# Patient Record
Sex: MALE | Race: BLACK OR AFRICAN AMERICAN | NOT HISPANIC OR LATINO | Employment: FULL TIME | URBAN - METROPOLITAN AREA
[De-identification: names, ages, dates, MRNs, and addresses within clinical notes are randomized per-mention and may not be internally consistent; named-entity substitution may affect disease eponyms.]

---

## 2018-04-24 ENCOUNTER — HOSPITAL ENCOUNTER (EMERGENCY)
Facility: HOSPITAL | Age: 23
Discharge: HOME | End: 2018-04-24
Attending: EMERGENCY MEDICINE | Admitting: EMERGENCY MEDICINE
Payer: COMMERCIAL

## 2018-04-24 VITALS
SYSTOLIC BLOOD PRESSURE: 159 MMHG | TEMPERATURE: 98.4 F | HEART RATE: 79 BPM | RESPIRATION RATE: 18 BRPM | WEIGHT: 160 LBS | OXYGEN SATURATION: 100 % | DIASTOLIC BLOOD PRESSURE: 83 MMHG

## 2018-04-24 DIAGNOSIS — R59.9 LYMPH NODE ENLARGEMENT: Primary | ICD-10-CM

## 2018-04-24 PROCEDURE — 99282 EMERGENCY DEPT VISIT SF MDM: CPT

## 2018-04-25 NOTE — ED PROVIDER NOTES
History  Chief Complaint   Patient presents with    Sore Throat     pt with sore throat and lump on R side for last few days that is causing him pain  Patient is a 40-year-old male presenting to the ER today with a chief complaint of a "lump on right side of my face"  States he noticed this lump over the past 3 days  States it is freely mobile  Occasionally associated with a dull ache  When symptoms persisted patient wanted to come to the ER for further evaluation treatment  During this time patient denies fevers, chills, night sweats, recent weight loss  Denies any sore throat  History provided by:  Patient   used: No    Sore Throat   Location:  Right  Onset quality:  Gradual  Duration:  3 days  Timing:  Constant  Progression:  Unchanged  Chronicity:  New  Relieved by:  Nothing  Worsened by:  Nothing  Ineffective treatments:  None tried  Associated symptoms: no abdominal pain, no chills and no fever        None       History reviewed  No pertinent past medical history  History reviewed  No pertinent surgical history  History reviewed  No pertinent family history  I have reviewed and agree with the history as documented  Social History   Substance Use Topics    Smoking status: Current Every Day Smoker     Packs/day: 0 20     Types: Cigarettes    Smokeless tobacco: Never Used    Alcohol use No        Review of Systems   Constitutional: Negative for activity change, appetite change, chills, fatigue and fever  HENT: Negative for congestion, dental problem and sore throat  Eyes: Negative  Respiratory: Negative  Cardiovascular: Negative  Gastrointestinal: Negative for abdominal distention, abdominal pain, blood in stool, constipation, diarrhea, nausea and vomiting  Endocrine: Negative      Genitourinary: Negative for decreased urine volume, difficulty urinating, dysuria, enuresis, flank pain, frequency, hematuria, penile swelling, scrotal swelling, testicular pain and urgency  Musculoskeletal: Negative  Skin: Negative  Allergic/Immunologic: Negative  Neurological: Negative  Hematological: Negative  Psychiatric/Behavioral: Negative  All other systems reviewed and are negative  Physical Exam  ED Triage Vitals   Temperature Pulse Respirations Blood Pressure SpO2   04/24/18 2009 04/24/18 2009 04/24/18 2009 04/24/18 2009 04/24/18 2009   98 4 °F (36 9 °C) 79 18 159/83 100 %      Temp Source Heart Rate Source Patient Position - Orthostatic VS BP Location FiO2 (%)   04/24/18 2009 04/24/18 2009 04/24/18 2009 04/24/18 2009 --   Oral Monitor Lying Right arm       Pain Score       04/24/18 2008       4           Orthostatic Vital Signs  Vitals:    04/24/18 2009   BP: 159/83   Pulse: 79   Patient Position - Orthostatic VS: Lying       Physical Exam   Constitutional: He is oriented to person, place, and time  He appears well-developed and well-nourished  HENT:   Head: Normocephalic and atraumatic  Right Ear: External ear normal    Left Ear: External ear normal    Nose: Nose normal    Mouth/Throat: Oropharynx is clear and moist    Palpable   25cm mass over the distal right masseter muscle  Freely mobile  Feels rubbery in texture  No overlying skin changes noted  Eyes: Conjunctivae and EOM are normal  Pupils are equal, round, and reactive to light  Neck: Normal range of motion  Neck supple  No JVD present  No tracheal deviation present  No thyromegaly present  Cardiovascular: Normal rate, regular rhythm, normal heart sounds and intact distal pulses  Exam reveals no gallop and no friction rub  No murmur heard  Pulmonary/Chest: Effort normal and breath sounds normal  No stridor  No respiratory distress  He has no wheezes  He has no rales  He exhibits no tenderness  Abdominal: Soft  Bowel sounds are normal  He exhibits no distension and no mass  There is no tenderness  There is no rebound and no guarding  No hernia     Musculoskeletal: Normal range of motion  He exhibits no edema, tenderness or deformity  Lymphadenopathy:     He has no cervical adenopathy  Neurological: He is alert and oriented to person, place, and time  He has normal reflexes  He displays normal reflexes  No cranial nerve deficit  He exhibits normal muscle tone  Coordination normal    Skin: Skin is warm  No rash noted  No erythema  No pallor  Psychiatric: He has a normal mood and affect  His behavior is normal  Judgment and thought content normal    Nursing note and vitals reviewed  ED Medications  Medications - No data to display    Diagnostic Studies  Results Reviewed     None                 No orders to display         Procedures  Procedures      Phone Consults  ED Phone Contact    ED Course  ED Course                                MDM  Number of Diagnoses or Management Options  Lymph node enlargement: new and requires workup     Amount and/or Complexity of Data Reviewed  Clinical lab tests: ordered and reviewed  Tests in the radiology section of CPT®: ordered and reviewed  Tests in the medicine section of CPT®: reviewed and ordered  Decide to obtain previous medical records or to obtain history from someone other than the patient: yes  Independent visualization of images, tracings, or specimens: yes    Patient Progress  Patient progress: stable    CritCare Time    Disposition  Final diagnoses:   Lymph node enlargement     Time reflects when diagnosis was documented in both MDM as applicable and the Disposition within this note     Time User Action Codes Description Comment    4/24/2018  8:29 PM Carmela Archer Add [R59 9] Lymph node enlargement       ED Disposition     ED Disposition Condition Comment    Discharge  [address] LYNDA Esqueda discharge to home/self care      Condition at discharge: Good        Follow-up Information     Follow up With Specialties Details Why Contact Info    [employer]  Schedule an appointment as soon as possible for a visit  [phone] There are no discharge medications for this patient  No discharge procedures on file  ED Provider  Attending physically available and evaluated Marcus Garcia  [employer] managed the patient along with the ED Attending      Electronically Signed by         Derek Jaramillo DO  04/24/18 2100

## 2018-04-25 NOTE — ED ATTENDING ATTESTATION
Annemarie Farrell MD, saw and evaluated the patient  I have discussed the patient with the resident/non-physician practitioner and agree with the resident's/non-physician practitioner's findings, Plan of Care, and MDM as documented in the resident's/non-physician practitioner's note, except where noted  All available labs and Radiology studies were reviewed  At this point I agree with the current assessment done in the Emergency Department    I have conducted an independent evaluation of this patient a history and physical is as follows:   pt has noted lump on side of jaw that occasionally is painful This is present for 3 days PE: small less than 1 cm freely moveable nontender  Somewhat rubbery mass Likely lymph node  Informed pt to follw if it gets hard painful or grows return we will ct if he is concerned will give fu for possible biopsy     Critical Care Time  CritCare Time    Procedures

## 2018-04-25 NOTE — DISCHARGE INSTRUCTIONS
Lymphadenopathy   WHAT YOU NEED TO KNOW:   Lymphadenopathy is swelling of your lymph nodes  Lymph nodes are small organs that are part of your immune system  The lymph nodes are found throughout your body  They are most easily felt in your neck, under your arms, and near your groin  Lymphadenopathy can occur in one or more areas of your body  It is usually caused by an infection  DISCHARGE INSTRUCTIONS:   Return to the emergency department if:   · The swollen lymph nodes bleed  · You have swollen lymph nodes in your neck that affect your breathing or swallowing  Contact your healthcare provider if:   · You have a fever  · You have a new swollen and painful lymph node  · You have a skin rash  · Your lymph node remains swollen or painful, or it gets bigger  · Your lymph node has red streaks around it, or the skin around the lymph node is red  · You have questions or concerns about your condition or care  Follow up with your healthcare provider as directed:  Write down your questions so you remember to ask them during your visits  Self-care:   · Do not poke or squeeze  the swollen lymph nodes  · Apply heat to the swollen glands  You may use warm compresses, or an electric heating pad set on low  · Rest as needed  If you have a fever, rest until your temperature returns to normal  Return to your normal daily activities slowly after your fever is gone  © 2017 [address] Information is for End User's use only and may not be sold, redistributed or otherwise used for commercial purposes  All illustrations and images included in CareNotes® are the copyrighted property of [employer]  or [facility]  The above information is an  only  It is not intended as medical advice for individual conditions or treatments  Talk to your doctor, nurse or pharmacist before following any medical regimen to see if it is safe and effective for you

## 2018-05-04 ENCOUNTER — HOSPITAL ENCOUNTER (EMERGENCY)
Facility: HOSPITAL | Age: 23
Discharge: HOME | End: 2018-05-04
Attending: EMERGENCY MEDICINE | Admitting: EMERGENCY MEDICINE
Payer: COMMERCIAL

## 2018-05-04 VITALS
SYSTOLIC BLOOD PRESSURE: 120 MMHG | RESPIRATION RATE: 18 BRPM | TEMPERATURE: 98.8 F | HEIGHT: 71 IN | DIASTOLIC BLOOD PRESSURE: 91 MMHG | OXYGEN SATURATION: 100 % | HEART RATE: 66 BPM | WEIGHT: 165 LBS | BODY MASS INDEX: 23.1 KG/M2

## 2018-05-04 DIAGNOSIS — R19.7 DIARRHEA: ICD-10-CM

## 2018-05-04 DIAGNOSIS — R11.2 NAUSEA AND VOMITING: Primary | ICD-10-CM

## 2018-05-04 PROCEDURE — 96374 THER/PROPH/DIAG INJ IV PUSH: CPT

## 2018-05-04 PROCEDURE — 99283 EMERGENCY DEPT VISIT LOW MDM: CPT

## 2018-05-04 RX ORDER — ONDANSETRON 4 MG/1
4 TABLET, ORALLY DISINTEGRATING ORAL EVERY 8 HOURS PRN
Qty: 8 TABLET | Refills: 0 | Status: SHIPPED | OUTPATIENT
Start: 2018-05-04 | End: 2020-01-26

## 2018-05-04 RX ORDER — DICYCLOMINE HCL 20 MG
20 TABLET ORAL 2 TIMES DAILY
Qty: 20 TABLET | Refills: 0 | Status: SHIPPED | OUTPATIENT
Start: 2018-05-04 | End: 2020-01-26

## 2018-05-04 RX ORDER — DICYCLOMINE HCL 20 MG
20 TABLET ORAL ONCE
Status: COMPLETED | OUTPATIENT
Start: 2018-05-04 | End: 2018-05-04

## 2018-05-04 RX ORDER — ONDANSETRON 4 MG/1
4 TABLET, ORALLY DISINTEGRATING ORAL EVERY 6 HOURS PRN
Qty: 20 TABLET | Refills: 0 | Status: SHIPPED | OUTPATIENT
Start: 2018-05-04 | End: 2018-05-04

## 2018-05-04 RX ORDER — ONDANSETRON 2 MG/ML
4 INJECTION INTRAMUSCULAR; INTRAVENOUS ONCE
Status: COMPLETED | OUTPATIENT
Start: 2018-05-04 | End: 2018-05-04

## 2018-05-04 RX ADMIN — ONDANSETRON 4 MG: 2 INJECTION INTRAMUSCULAR; INTRAVENOUS at 08:30

## 2018-05-04 RX ADMIN — DICYCLOMINE HYDROCHLORIDE 20 MG: 20 TABLET ORAL at 08:29

## 2018-05-04 RX ADMIN — SODIUM CHLORIDE 500 ML: 0.9 INJECTION, SOLUTION INTRAVENOUS at 08:28

## 2018-05-04 NOTE — DISCHARGE INSTRUCTIONS
Acute Nausea and Vomiting, Ambulatory Care   GENERAL INFORMATION:   Acute nausea and vomiting  starts suddenly, gets worse quickly, and lasts a short time  Nausea and vomiting may be caused by pregnancy, alcohol, infection, or medicines  Common related symptoms include the following:   · Fever    · Abdominal swelling    · Pain, tenderness, or a lump in the abdomen    · Splashing sounds heard in your stomach when you move  Seek immediate care for the following symptoms:   · Blood in your vomit or bowel movements    · Sudden, severe pain in your chest and upper abdomen after hard vomiting    · Dizziness, dry mouth, and thirst    · Urinating very little or not at all    · Muscle weakness, leg cramps, and trouble breathing    · A heart beat that is faster than normal    · Vomiting for more than 48 hours  Treatment for acute nausea and vomiting  may include medicines to calm your stomach and stop the vomiting  You may need IV fluids if you are dehydrated  Manage your nausea and vomiting:   · Drink liquids as directed to prevent dehydration  Ask how much liquid to drink each day and which liquids are best for you  You may need to drink an oral rehydration solution (ORS)  ORS contains water, salts, and sugar that are needed to replace the lost body fluids  Ask what kind of ORS to use, how much to drink, and where to get it  · Eat smaller meals, more often  Eat small amounts of food every 2 to 3 hours, even if you are not hungry  Food in your stomach may help decrease your nausea  · Avoid stress  Find ways to relax and manage your stress  Headaches due to stress may cause nausea and vomiting  Get more rest and sleep  Follow up with your healthcare provider as directed:  Write down your questions so you remember to ask them during your visits  CARE AGREEMENT:   You have the right to help plan your care  Learn about your health condition and how it may be treated   Discuss treatment options with your caregivers to decide what care you want to receive  You always have the right to refuse treatment  The above information is an  only  It is not intended as medical advice for individual conditions or treatments  Talk to your doctor, nurse or pharmacist before following any medical regimen to see if it is safe and effective for you  © 2014 [address] is for End User's use only and may not be sold, redistributed or otherwise used for commercial purposes  All illustrations and images included in CareNotes® are the copyrighted property of [employer]  or [employer]  Acute Diarrhea   WHAT YOU NEED TO KNOW:   What is acute diarrhea? Acute diarrhea starts quickly and lasts a short time, usually 1 to 3 days  It can last up to 2 weeks  What causes acute diarrhea? · Bacteria, such as E coli or salmonella    · Viruses, such as rotavirus and norovirus    · A parasite, such as giardia    · Medicines, such as laxatives, antacids, or antibiotics    · An allergy to lactose, soy, or gluten    · Eating food or drinking water that contains germs    · Medical treatments, such as chemotherapy or radiation  What other signs and symptoms might I have with acute diarrhea? You may have 3 or more episodes of diarrhea  It may be hard to control your diarrhea  You may also have any of the following:  · Fever and chills    · Headache or abdominal pain    · Nausea and vomiting    · Symptoms of dehydration such as thirst, decreased urination, dry skin, sunken eyes, or fast, pounding heartbeat  What does my healthcare provider need to know about my acute diarrhea? Your healthcare provider will ask about your symptoms  He or she will ask what you have recently eaten and if you have traveled to other countries  Tell the provider what medicines you use or if you have been around anyone who is sick  Your healthcare provider may check you for signs of dehydration     How is acute diarrhea treated? Acute diarrhea usually gets better without treatment  You may need any of the following if your diarrhea is severe or lasts longer than a few days:  · Diarrhea medicine  is an over-the-counter medicine that helps slow or stop your diarrhea  · Antibiotics  may be given to help treat an infection caused by bacteria  · Parasite medicine  may be given to treat an infection caused by parasites  How can acute diarrhea be managed? · Drink liquids as directed  Liquids will help prevent dehydration caused by diarrhea  Ask your healthcare provider how much liquid to drink each day and which liquids are best for you  You may need to drink an oral rehydration solution (ORS)  An ORS has the right amounts of water, salts, and sugar you need to replace body fluids  You can buy an ORS at most grocery stores and pharmacies  · Eat foods that are easy to digest   Examples include rice, lentils, cereal, bananas, potatoes, and bread  It also includes some fruits (bananas, melon), well-cooked vegetables, and lean meats  Avoid foods high in fiber, fat, and sugar  Also avoid caffeine, alcohol, dairy, and red meat until your diarrhea is gone  How can acute diarrhea be prevented? · Wash your hands often  Use soap and water  Wash your hands before you eat or prepare food  Also wash your hands after you use the bathroom  Use an alcohol-based hand gel when soap and water are not available  · Keep bathroom surfaces clean  This helps prevent the spread of germs that cause acute diarrhea  · Wash fruits and vegetables well before you eat them  This can help remove germs that cause diarrhea  If possible, remove the skin from fruits and vegetables, or cook them well before you eat them  · Cook meat as directed  ¨ Cook ground meat  to 160°F      ¨ Cook ground poultry, whole poultry, or cuts of poultry  to at least 165°F  Remove the meat from heat  Let it stand for 3 minutes before you eat it       [employer] whole cuts of meat other than poultry  to at least 145°F  Remove the meat from heat  Let it stand for 3 minutes before you eat it  · Wash dishes that have touched raw meat with hot water and soap  This includes cutting boards, utensils, dishes, and serving containers  · Place raw or cooked meat in the refrigerator as soon as possible  Bacteria can grow in meat that is left at room temperature too long  · Do not eat raw or undercooked oysters, clams, or mussels  These foods may be contaminated and cause infection  · Drink filtered or treated water only when you travel  Do not put ice in your drinks  Drink bottled water whenever possible  When should I seek immediate care? · You feel confused  · Your heartbeat is faster than normal      · Your eyes look deeply sunken, or you have no tears when you cry  · You urinate less than usual, or your urine is dark yellow  · You have blood or mucus in your stools  · You have severe abdominal pain  · You are unable to drink any liquids  When should I contact my healthcare provider? · Your symptoms do not get better with treatment  · You have a fever higher than 101 3°F (38 5°C)  · You have trouble eating and drinking because you are vomiting  · You are thirsty or have a dry mouth  · Your diarrhea does not get better in 7 days  · You have questions or concerns about your condition or care  CARE AGREEMENT:   You have the right to help plan your care  Learn about your health condition and how it may be treated  Discuss treatment options with your caregivers to decide what care you want to receive  You always have the right to refuse treatment  The above information is an  only  It is not intended as medical advice for individual conditions or treatments  Talk to your doctor, nurse or pharmacist before following any medical regimen to see if it is safe and effective for you    © 2017 [employer] [address] [place] [address] is for End User's use only and may not be sold, redistributed or otherwise used for commercial purposes  All illustrations and images included in CareNotes® are the copyrighted property of [employer]  or [employer]

## 2018-05-04 NOTE — ED PROVIDER NOTES
History  Chief Complaint   Patient presents with    Vomiting     Vomitng and diarrhea since yesterday afternoon  Only able to keep small amounts of fluids in      24 y/o male with Nausea and vomiting + diarrhea x 24 hrs  States started suddenly  Essentially no abd pain but does get intermediate cramping sensation throughout  Works in a call center; no known sick contacts  Has been drinking liquids but throwing up solids  Denies F/C, CP, SOB and urinary symptoms  No blood in stool  No drinking unclean water, undercooked food  No recent travel  No medical issues outside of past :anemia" not on anything for  Vitals WNL's; patient appears comfortable  No abd pain at this time, no recent episodes of emesis  None       History reviewed  No pertinent past medical history  History reviewed  No pertinent surgical history  History reviewed  No pertinent family history  I have reviewed and agree with the history as documented  Social History   Substance Use Topics    Smoking status: Current Every Day Smoker     Packs/day: 0 20     Types: Cigarettes    Smokeless tobacco: Never Used    Alcohol use No        Review of Systems   Constitutional: Positive for appetite change  Negative for activity change, chills and fever  HENT: Negative for congestion, rhinorrhea and sore throat  Eyes: Negative for photophobia, pain and visual disturbance  Respiratory: Negative for cough, chest tightness and wheezing  Cardiovascular: Negative for chest pain, palpitations and leg swelling  Gastrointestinal: Positive for diarrhea, nausea and vomiting  Negative for abdominal distention, abdominal pain and constipation  Genitourinary: Negative for decreased urine volume, difficulty urinating, dysuria, flank pain, frequency and hematuria  Musculoskeletal: Negative for arthralgias, back pain, myalgias, neck pain and neck stiffness  Skin: Negative for color change, pallor, rash and wound  Neurological: Negative for seizures, syncope, speech difficulty, weakness, light-headedness and headaches  Hematological: Negative for adenopathy  Psychiatric/Behavioral: Negative for agitation, confusion, hallucinations and suicidal ideas  Physical Exam  ED Triage Vitals   Temperature Pulse Respirations Blood Pressure SpO2   05/04/18 0727 05/04/18 0727 05/04/18 0727 05/04/18 0727 05/04/18 0727   98 8 °F (37 1 °C) 75 18 141/76 100 %      Temp Source Heart Rate Source Patient Position - Orthostatic VS BP Location FiO2 (%)   05/04/18 0727 05/04/18 0727 05/04/18 0845 05/04/18 0915 --   Oral Monitor Sitting Right arm       Pain Score       05/04/18 0727       1           Orthostatic Vital Signs  Vitals:    05/04/18 0727 05/04/18 0845 05/04/18 0915   BP: 141/76 127/85 120/91   Pulse: 75 68 66   Patient Position - Orthostatic VS:  Sitting Sitting       Physical Exam   Constitutional: He is oriented to person, place, and time  He appears well-developed and well-nourished  No distress  HENT:   Head: Normocephalic and atraumatic  Right Ear: External ear normal    Left Ear: External ear normal    Mouth/Throat: No oropharyngeal exudate  Moist oral mucosa   Eyes: EOM are normal  Pupils are equal, round, and reactive to light  Right eye exhibits no discharge  Left eye exhibits no discharge  No scleral icterus  Neck: Normal range of motion  Neck supple  No JVD present  No tracheal deviation present  Cardiovascular: Normal rate and normal heart sounds  No murmur heard  Pulmonary/Chest: Effort normal and breath sounds normal  No respiratory distress  He has no wheezes  He has no rales  No increased WOB   Abdominal: Soft  Bowel sounds are normal  He exhibits no distension  There is no tenderness  There is no rebound and no guarding  No reproudcible abd tenderness x 4 quadrants  Soft, not distended   Musculoskeletal: Normal range of motion  He exhibits no edema, tenderness or deformity  Lymphadenopathy:     He has no cervical adenopathy  Neurological: He is alert and oriented to person, place, and time  No cranial nerve deficit  He exhibits normal muscle tone  Skin: Skin is warm and dry  Capillary refill takes less than 2 seconds  No erythema  No pallor  Psychiatric: He has a normal mood and affect  His behavior is normal  Judgment and thought content normal        ED Medications  Medications   ondansetron (ZOFRAN) injection 4 mg (4 mg Intravenous Given 5/4/18 0830)   sodium chloride 0 9 % bolus 500 mL (0 mL Intravenous Stopped 5/4/18 0854)   dicyclomine (BENTYL) tablet 20 mg (20 mg Oral Given 5/4/18 0829)       Diagnostic Studies  Results Reviewed     None                 No orders to display         Procedures  Procedures      Phone Consults  ED Phone Contact    ED Course                               MDM  Number of Diagnoses or Management Options  Diarrhea:   Nausea and vomiting:   Diagnosis management comments: N/V/D likely viral gastroenteritis  Has no abd discomfort at this time  Vitals WNL's  Already had IV placed so gave IVF and zofran  Felt well, no further episodes of symptoms, D/C home with zofran and bentyl  Understands to return if continues to be unable to keep down PO intake, return precautions  CritCare Time    Disposition  Final diagnoses:   Nausea and vomiting   Diarrhea     Time reflects when diagnosis was documented in both MDM as applicable and the Disposition within this note     Time User Action Codes Description Comment    5/4/2018  9:17 AM Judd Diver Add [R11 2] Nausea and vomiting     5/4/2018  9:17 AM Judd Diver Add [R19 7] Diarrhea       ED Disposition     ED Disposition Condition Comment    Discharge  Coral Conn discharge to home/self care      Condition at discharge: Good        Follow-up Information     Follow up With Specialties Details Why Contact Info Additional [address] Emergency Department Emergency Medicine Go in 2 days As needed, If symptoms worsen [address]  [phone]  ED, [address] [address], [place], [place], [facility]  Call  to establish family doctor [phone]           Discharge Medication List as of 5/4/2018  9:18 AM      START taking these medications    Details   dicyclomine (BENTYL) 20 mg tablet Take 1 tablet (20 mg total) by mouth 2 (two) times a day, Starting Fri 5/4/2018, Print      ondansetron (ZOFRAN-ODT) 4 mg disintegrating tablet Take 1 tablet (4 mg total) by mouth every 6 (six) hours as needed for nausea or vomiting, Starting Fri 5/4/2018, Print           No discharge procedures on file  ED Provider  Attending physically available and evaluated Blake Brizuela I managed the patient along with the ED Attending      Electronically Signed by         Oz Perry DO  05/05/18 0699

## 2018-05-04 NOTE — ED ATTENDING ATTESTATION
Mary Lou Romo MD, saw and evaluated the patient  I have discussed the patient with the resident/non-physician practitioner and agree with the resident's/non-physician practitioner's findings, Plan of Care, and MDM as documented in the resident's/non-physician practitioner's note, except where noted  All available labs and Radiology studies were reviewed  At this point I agree with the current assessment done in the Emergency Department  I have conducted an independent evaluation of this patient a history and physical is as follows:  23year-old with vomiting and diarrhea since yesterday  Last episode of vomiting was this morning  Patient had about 20 episodes of diarrhea over the last 24 hr   No fevers or chills  No abdominal pain  No prior surgical history  Patient is otherwise healthy  Review of systems otherwise negative in 12 systems were reviewed  On exam Vital signs were reviewed  Patient is awake, alert, interactive  The patient's pupils are equally round reactive to light  Oropharynx is clear with moist mucous membranes  Neck is supple and nontender with no adenopathy or JVD  Heart is regular with no murmurs, rubs, or gallops  Lungs are clear and equal with no wheezes, rales, or rhonchi  Abdomen is soft and nontender with no masses, rebound, or guarding  There is no CVA tenderness  The patient was completely exposed  There is no skin breakdown  There are no rashes or skin changes  Extremities are warm and well perfused with good pulses  The patient has normal strength, sensation, and cranial nerves  Impression:  Gastroenteritis    Patient has IV in place, will give IV fluids and antiemetics and discharge    Critical Care Time  CritCare Time    Procedures

## 2018-05-08 ENCOUNTER — HOSPITAL ENCOUNTER (EMERGENCY)
Facility: HOSPITAL | Age: 23
Discharge: HOME | End: 2018-05-08
Attending: EMERGENCY MEDICINE | Admitting: EMERGENCY MEDICINE
Payer: COMMERCIAL

## 2018-05-08 VITALS
BODY MASS INDEX: 23.01 KG/M2 | TEMPERATURE: 98.3 F | RESPIRATION RATE: 14 BRPM | HEART RATE: 102 BPM | OXYGEN SATURATION: 99 % | SYSTOLIC BLOOD PRESSURE: 131 MMHG | WEIGHT: 165 LBS | DIASTOLIC BLOOD PRESSURE: 64 MMHG

## 2018-05-08 DIAGNOSIS — R11.2 NAUSEA, VOMITING, AND DIARRHEA: ICD-10-CM

## 2018-05-08 DIAGNOSIS — R19.7 NAUSEA, VOMITING, AND DIARRHEA: ICD-10-CM

## 2018-05-08 DIAGNOSIS — R10.9 ABDOMINAL PAIN: Primary | ICD-10-CM

## 2018-05-08 LAB
ALBUMIN SERPL BCP-MCNC: 4.1 G/DL (ref 3.5–5)
ALP SERPL-CCNC: 54 U/L (ref 46–116)
ALT SERPL W P-5'-P-CCNC: 48 U/L (ref 12–78)
ANION GAP SERPL CALCULATED.3IONS-SCNC: 5 MMOL/L (ref 4–13)
AST SERPL W P-5'-P-CCNC: 26 U/L (ref 5–45)
BACTERIA UR QL AUTO: ABNORMAL /HPF
BASOPHILS # BLD AUTO: 0.03 THOUSANDS/ΜL (ref 0–0.1)
BASOPHILS NFR BLD AUTO: 0 % (ref 0–1)
BILIRUB SERPL-MCNC: 0.94 MG/DL (ref 0.2–1)
BILIRUB UR QL STRIP: ABNORMAL
BUN SERPL-MCNC: 14 MG/DL (ref 5–25)
CALCIUM SERPL-MCNC: 9.6 MG/DL (ref 8.3–10.1)
CHLORIDE SERPL-SCNC: 104 MMOL/L (ref 100–108)
CLARITY UR: CLEAR
CO2 SERPL-SCNC: 27 MMOL/L (ref 21–32)
COLOR UR: YELLOW
CREAT SERPL-MCNC: 1.12 MG/DL (ref 0.6–1.3)
EOSINOPHIL # BLD AUTO: 0.09 THOUSAND/ΜL (ref 0–0.61)
EOSINOPHIL NFR BLD AUTO: 1 % (ref 0–6)
ERYTHROCYTE [DISTWIDTH] IN BLOOD BY AUTOMATED COUNT: 14.6 % (ref 11.6–15.1)
GFR SERPL CREATININE-BSD FRML MDRD: 106 ML/MIN/1.73SQ M
GLUCOSE SERPL-MCNC: 98 MG/DL (ref 65–140)
GLUCOSE UR STRIP-MCNC: NEGATIVE MG/DL
HCT VFR BLD AUTO: 44.9 % (ref 36.5–49.3)
HGB BLD-MCNC: 15.9 G/DL (ref 12–17)
HGB UR QL STRIP.AUTO: ABNORMAL
HYALINE CASTS #/AREA URNS LPF: ABNORMAL /LPF
KETONES UR STRIP-MCNC: ABNORMAL MG/DL
LEUKOCYTE ESTERASE UR QL STRIP: NEGATIVE
LIPASE SERPL-CCNC: 89 U/L (ref 73–393)
LYMPHOCYTES # BLD AUTO: 2.43 THOUSANDS/ΜL (ref 0.6–4.47)
LYMPHOCYTES NFR BLD AUTO: 17 % (ref 14–44)
MAGNESIUM SERPL-MCNC: 2.2 MG/DL (ref 1.6–2.6)
MCH RBC QN AUTO: 31.3 PG (ref 26.8–34.3)
MCHC RBC AUTO-ENTMCNC: 35.4 G/DL (ref 31.4–37.4)
MCV RBC AUTO: 88 FL (ref 82–98)
MONOCYTES # BLD AUTO: 1.34 THOUSAND/ΜL (ref 0.17–1.22)
MONOCYTES NFR BLD AUTO: 9 % (ref 4–12)
NEUTROPHILS # BLD AUTO: 10.7 THOUSANDS/ΜL (ref 1.85–7.62)
NEUTS SEG NFR BLD AUTO: 73 % (ref 43–75)
NITRITE UR QL STRIP: NEGATIVE
NON-SQ EPI CELLS URNS QL MICRO: ABNORMAL /HPF
NRBC BLD AUTO-RTO: 0 /100 WBCS
PH UR STRIP.AUTO: 6.5 [PH] (ref 4.5–8)
PLATELET # BLD AUTO: 239 THOUSANDS/UL (ref 149–390)
PMV BLD AUTO: 10.7 FL (ref 8.9–12.7)
POTASSIUM SERPL-SCNC: 3.6 MMOL/L (ref 3.5–5.3)
PROT SERPL-MCNC: 8.6 G/DL (ref 6.4–8.2)
PROT UR STRIP-MCNC: ABNORMAL MG/DL
RBC # BLD AUTO: 5.08 MILLION/UL (ref 3.88–5.62)
RBC #/AREA URNS AUTO: ABNORMAL /HPF
SODIUM SERPL-SCNC: 136 MMOL/L (ref 136–145)
SP GR UR STRIP.AUTO: 1.02 (ref 1–1.03)
UROBILINOGEN UR QL STRIP.AUTO: 1 E.U./DL
WBC # BLD AUTO: 14.64 THOUSAND/UL (ref 4.31–10.16)
WBC #/AREA URNS AUTO: ABNORMAL /HPF

## 2018-05-08 PROCEDURE — 85025 COMPLETE CBC W/AUTO DIFF WBC: CPT | Performed by: EMERGENCY MEDICINE

## 2018-05-08 PROCEDURE — 81001 URINALYSIS AUTO W/SCOPE: CPT

## 2018-05-08 PROCEDURE — 83690 ASSAY OF LIPASE: CPT | Performed by: EMERGENCY MEDICINE

## 2018-05-08 PROCEDURE — 99284 EMERGENCY DEPT VISIT MOD MDM: CPT

## 2018-05-08 PROCEDURE — 96361 HYDRATE IV INFUSION ADD-ON: CPT

## 2018-05-08 PROCEDURE — 93005 ELECTROCARDIOGRAM TRACING: CPT

## 2018-05-08 PROCEDURE — 80053 COMPREHEN METABOLIC PANEL: CPT | Performed by: EMERGENCY MEDICINE

## 2018-05-08 PROCEDURE — 96374 THER/PROPH/DIAG INJ IV PUSH: CPT

## 2018-05-08 PROCEDURE — 36415 COLL VENOUS BLD VENIPUNCTURE: CPT | Performed by: EMERGENCY MEDICINE

## 2018-05-08 PROCEDURE — 83735 ASSAY OF MAGNESIUM: CPT | Performed by: EMERGENCY MEDICINE

## 2018-05-08 RX ORDER — ONDANSETRON 2 MG/ML
4 INJECTION INTRAMUSCULAR; INTRAVENOUS ONCE
Status: COMPLETED | OUTPATIENT
Start: 2018-05-08 | End: 2018-05-08

## 2018-05-08 RX ORDER — ONDANSETRON 4 MG/1
4 TABLET, ORALLY DISINTEGRATING ORAL EVERY 8 HOURS PRN
Qty: 10 TABLET | Refills: 0 | Status: SHIPPED | OUTPATIENT
Start: 2018-05-08 | End: 2020-01-26

## 2018-05-08 RX ADMIN — ONDANSETRON 4 MG: 2 INJECTION INTRAMUSCULAR; INTRAVENOUS at 20:44

## 2018-05-08 RX ADMIN — HYOSCYAMINE SULFATE 0.12 MG: 0.12 TABLET ORAL at 20:45

## 2018-05-08 RX ADMIN — SODIUM CHLORIDE 2000 ML: 0.9 INJECTION, SOLUTION INTRAVENOUS at 20:33

## 2018-05-09 LAB
ATRIAL RATE: 98 BPM
P AXIS: 83 DEGREES
PR INTERVAL: 160 MS
QRS AXIS: 83 DEGREES
QRSD INTERVAL: 92 MS
QT INTERVAL: 320 MS
QTC INTERVAL: 408 MS
T WAVE AXIS: 76 DEGREES
VENTRICULAR RATE: 98 BPM

## 2018-05-09 PROCEDURE — 93010 ELECTROCARDIOGRAM REPORT: CPT | Performed by: INTERNAL MEDICINE

## 2018-05-09 NOTE — ED PROVIDER NOTES
History  Chief Complaint   Patient presents with    Abdominal Pain     Pt c/o abdominal cramping and NVD  Pt vomited 1x today and diarrhea 4x today  This is a 23 y o  old male who presents to the ED for evaluation of abdominal pain  Patient reports generalized abdominal discomfort with cramping, vomiting, diarrhea  Started this morning  It has been constant  He has had difficulty keeping down solids and liquids  No melena, hematochezia, hematemesis  No known sick contacts  Denies any fever, chills, night sweats, cough, just, runny nose, chest pain, shortness of breath, back pain, urinary symptoms  Prior to Admission Medications   Prescriptions Last Dose Informant Patient Reported? Taking?   dicyclomine (BENTYL) 20 mg tablet   No No   Sig: Take 1 tablet (20 mg total) by mouth 2 (two) times a day   ondansetron (ZOFRAN-ODT) 4 mg disintegrating tablet   No No   Sig: Take 1 tablet (4 mg total) by mouth every 8 (eight) hours as needed for nausea or vomiting      Facility-Administered Medications: None     History reviewed  No pertinent past medical history  History reviewed  No pertinent surgical history  History reviewed  No pertinent family history  I have reviewed and agree with the history as documented  Social History   Substance Use Topics    Smoking status: Current Every Day Smoker     Packs/day: 0 20     Types: Cigarettes    Smokeless tobacco: Never Used    Alcohol use No      Review of Systems   Constitutional: Negative for chills, fatigue, fever and unexpected weight change  HENT: Negative for congestion, rhinorrhea and sore throat  Eyes: Negative for redness and visual disturbance  Respiratory: Negative for cough and shortness of breath  Cardiovascular: Negative for chest pain and leg swelling  Gastrointestinal: Positive for abdominal pain, nausea and vomiting  Negative for constipation and diarrhea  Endocrine: Negative for cold intolerance and heat intolerance  Genitourinary: Negative for dysuria, frequency and urgency  Musculoskeletal: Negative for back pain  Skin: Negative for rash  Neurological: Negative for dizziness, syncope and numbness  All other systems reviewed and are negative  Physical Exam  ED Triage Vitals   Temperature Pulse Respirations Blood Pressure SpO2   05/08/18 1930 05/08/18 1930 05/08/18 1930 05/08/18 1930 05/08/18 1930   98 3 °F (36 8 °C) (!) 120 16 136/69 98 %      Temp src Heart Rate Source Patient Position - Orthostatic VS BP Location FiO2 (%)   -- 05/08/18 1958 05/08/18 2100 05/08/18 1958 --    Monitor Lying Left arm       Pain Score       05/08/18 1930       5         Physical Exam   Constitutional: He is oriented to person, place, and time  He appears well-developed and well-nourished  No distress  HENT:   Head: Normocephalic and atraumatic  Nose: Nose normal    Eyes: Conjunctivae and EOM are normal  Pupils are equal, round, and reactive to light  Neck: Normal range of motion  Neck supple  Cardiovascular: Normal rate, regular rhythm and normal heart sounds  Exam reveals no gallop  No murmur heard  Pulmonary/Chest: Effort normal and breath sounds normal  No respiratory distress  He has no wheezes  He has no rales  Abdominal: Soft  Bowel sounds are normal  He exhibits no distension and no mass  There is no tenderness  There is no rebound and no guarding  Musculoskeletal: Normal range of motion  He exhibits no edema or deformity  Lymphadenopathy:     He has no cervical adenopathy  Neurological: He is alert and oriented to person, place, and time  No cranial nerve deficit  Skin: Skin is warm and dry  No rash noted  He is not diaphoretic  No erythema  Psychiatric: He has a normal mood and affect  Nursing note and vitals reviewed      ED Medications  Medications   sodium chloride 0 9 % bolus 2,000 mL (0 mL Intravenous Stopped 5/8/18 2212)   ondansetron (ZOFRAN) injection 4 mg (4 mg Intravenous Given 5/8/18 2044) hyoscyamine (LEVSIN/SL) SL tablet 0 125 mg (0 125 mg Sublingual Given 5/8/18 2045)     Diagnostic Studies  Results Reviewed     Procedure Component Value Units Date/Time    Urine Microscopic [[id number]]  (Abnormal) Collected:  05/08/18 2205    Lab Status:  Final result Specimen:  Urine from Urine, Clean Catch Updated:  05/08/18 2211     RBC, UA 4-10 (A) /hpf      WBC, UA 2-4 (A) /hpf      Epithelial Cells None Seen /hpf      Bacteria, UA None Seen /hpf      Hyaline Casts, UA 5-10 (A) /lpf     ED Urine Macroscopic [[id number]]  (Abnormal) Collected:  05/08/18 2205    Lab Status:  Final result Specimen:  Urine Updated:  05/08/18 2200     Color, UA Yellow     Clarity, UA Clear     pH, UA 6 5     Leukocytes, UA Negative     Nitrite, UA Negative     Protein,  (2+) (A) mg/dl      Glucose, UA Negative mg/dl      Ketones, UA 15 (1+) (A) mg/dl      Urobilinogen, UA 1 0 E U /dl      Bilirubin, UA Interference- unable to analyze (A)     Blood, UA Trace (A)     Specific [place], UA 1 025    Narrative:       CLINITEK RESULT    Comprehensive metabolic panel [[id number]]  (Abnormal) Collected:  05/08/18 2031    Lab Status:  Final result Specimen:  Blood from Arm, Left Updated:  05/08/18 2135     Sodium 136 mmol/L      Potassium 3 6 mmol/L      Chloride 104 mmol/L      CO2 27 mmol/L      Anion Gap 5 mmol/L      BUN 14 mg/dL      Creatinine 1 12 mg/dL      Glucose 98 mg/dL      Calcium 9 6 mg/dL      AST 26 U/L      ALT 48 U/L      Alkaline Phosphatase 54 U/L      Total Protein 8 6 (H) g/dL      Albumin 4 1 g/dL      Total Bilirubin 0 94 mg/dL      eGFR 106 ml/min/1 73sq m     Narrative:         National Kidney Disease Education Program recommendations are as follows:  GFR calculation is accurate only with a steady state creatinine  Chronic Kidney disease less than 60 ml/min/1 73 sq  meters  Kidney failure less than 15 ml/min/1 73 sq  meters      Lipase [[id number]]  (Normal) Collected:  05/08/18 2031    Lab Status:  Final result Specimen:  Blood from Arm, Left Updated:  05/08/18 2135     Lipase 89 u/L     Magnesium [[id number]]  (Normal) Collected:  05/08/18 2031    Lab Status:  Final result Specimen:  Blood from Arm, Left Updated:  05/08/18 2135     Magnesium 2 2 mg/dL     CBC and differential [[id number]]  (Abnormal) Collected:  05/08/18 2031    Lab Status:  Final result Specimen:  Blood from Arm, Left Updated:  05/08/18 2056     WBC 14 64 (H) Thousand/uL      RBC 5 08 Million/uL      Hemoglobin 15 9 g/dL      Hematocrit 44 9 %      MCV 88 fL      MCH 31 3 pg      MCHC 35 4 g/dL      RDW 14 6 %      MPV 10 7 fL      Platelets 738 Thousands/uL      nRBC 0 /100 WBCs      Neutrophils Relative 73 %      Lymphocytes Relative 17 %      Monocytes Relative 9 %      Eosinophils Relative 1 %      Basophils Relative 0 %      Neutrophils Absolute 10 70 (H) Thousands/µL      Lymphocytes Absolute 2 43 Thousands/µL      Monocytes Absolute 1 34 (H) Thousand/µL      Eosinophils Absolute 0 09 Thousand/µL      Basophils Absolute 0 03 Thousands/µL     POCT urinalysis dipstick [[id number]]     Lab Status:  No result Specimen:  Urine         No orders to display     Procedures  ECG 12 Lead Documentation  Date/Time: 5/8/2018 8:35 PM  Performed by: Mary Ellen Bernard  Authorized by: Weyman Meigs D     Indications / Diagnosis:  Tachycardia  ECG reviewed by me, the ED Provider: yes    Patient location:  ED  Comments:      Sinus tachycardia, rate 100, normal intervals, normal axis, no acute ST, T-wave changes  No previous EKG is available  Phone Consults  ED Phone Contact    ED Course     A/P: This is a 23 y o  male who presents to the ED for evaluation of abdominal pain  He has a benign exam   Will check abdominal labs, EKG  Treat symptomatically  Re-evaluate  2200 patient's labs reviewed  Unremarkable  Urine does show small ketones consistent with nausea and vomiting  Patient is currently tolerating an oral diet  His tachycardia has resolved   I personally discussed return precautions with this patient  I provided the patient with written discharge instructions and particularly highlighted specific areas of interest to this patient, including but not limited to: medications for symptom managment, follow up recommendations, and return precautions  Patient is in agreement with this plan as outlined above  MDM  CritCare Time    Disposition  Final diagnoses:   Abdominal pain   Nausea, vomiting, and diarrhea     Time reflects when diagnosis was documented in both MDM as applicable and the Disposition within this note     Time User Action Codes Description Comment    5/8/2018 10:06 PM Adonis Mohr Add [R10 9] Abdominal pain     5/8/2018 10:06 PM Adonis Mohr Add [R11 2,  R19 7] Nausea, vomiting, and diarrhea       ED Disposition     ED Disposition Condition Comment    Discharge  Jaden Cid discharge to home/self care  Condition at discharge: Stable        Follow-up Information     Follow up With Specialties Details Why Contact Info Additional [address] Emergency Department Emergency Medicine Go to As needed [address] [place] [address], [address] [place] [address], [id number]    [employer]  Call Referral to [phone]           Discharge Medication List as of 5/8/2018 10:07 PM      START taking these medications    Details   hyoscyamine (LEVSIN/SL) 0 125 mg SL tablet Take 1 tablet (0 125 mg total) by mouth every 4 (four) hours as needed for cramping, Starting Tue 5/8/2018, Print      !! ondansetron (ZOFRAN-ODT) 4 mg disintegrating tablet Take 1 tablet (4 mg total) by mouth every 8 (eight) hours as needed for nausea or vomiting, Starting Tue 5/8/2018, Print       !! - Potential duplicate medications found  Please discuss with provider        CONTINUE these medications which have NOT CHANGED    Details   dicyclomine (BENTYL) 20 mg tablet Take 1 tablet (20 mg total) by mouth 2 (two) times a day, Starting Fri 5/4/2018, Print      !! ondansetron (ZOFRAN-ODT) 4 mg disintegrating tablet Take 1 tablet (4 mg total) by mouth every 8 (eight) hours as needed for nausea or vomiting, Starting Fri 5/4/2018, Print       !! - Potential duplicate medications found  Please discuss with provider  No discharge procedures on file  ED Provider  Attending physically available and evaluated Bobby Ortiz I managed the patient along with the ED Attending      Electronically Signed by         Henny Ruiz MD  05/08/18 1718

## 2018-05-09 NOTE — ED ATTENDING ATTESTATION
Francisco Burnham DO, saw and evaluated the patient  I have discussed the patient with the resident/non-physician practitioner and agree with the resident's/non-physician practitioner's findings, Plan of Care, and MDM as documented in the resident's/non-physician practitioner's note, except where noted  All available labs and Radiology studies were reviewed  At this point I agree with the current assessment done in the Emergency Department  I have conducted an independent evaluation of this patient including a focused history and a physical exam     ED Note - Bean Dutton 23 y o  male MRN: [id number]  Unit/Bed#: ED 23 Encounter: [id number]    History of Present Illness   HPI  Bean Dutton is a 23 y o  male who presents for evaluation of diffuse abdominal cramping associated with n/v/d since this morning  No fever chills  No melena or hematochezia  No urinary symptoms or flank pain  No home remedies  No obvious alleviating or exacerbating factors  No obvious sick contacts  No obvious abnormal food ingestions  REVIEW OF SYSTEMS  See HPI for further details  12 systems reviewed and otherwise negative except as noted  Historical Information     PAST MEDICAL HISTORY  History reviewed  No pertinent past medical history  FAMILY HISTORY  History reviewed  No pertinent family history  SOCIAL HISTORY  Social History     Social History    Marital status: Single     Spouse name: N/A    Number of children: N/A    Years of education: N/A     Social History Main Topics    Smoking status: Current Every Day Smoker     Packs/day: 0 20     Types: Cigarettes    Smokeless tobacco: Never Used    Alcohol use No    Drug use: No    Sexual activity: Not Asked     Other Topics Concern    None     Social History Narrative    None       SURGICAL HISTORY  History reviewed  No pertinent surgical history    Meds/Allergies     CURRENT MEDICATIONS    Current Facility-Administered Medications:    sodium chloride 0 9 % bolus 2,000 mL, 2,000 mL, Intravenous, Once, Ivan Slade MD, Last Rate: 2,000 mL/hr at 05/08/18 2033, 2,000 mL at 05/08/18 2033    Current Outpatient Prescriptions:     dicyclomine (BENTYL) 20 mg tablet, Take 1 tablet (20 mg total) by mouth 2 (two) times a day, Disp: 20 tablet, Rfl: 0    ondansetron (ZOFRAN-ODT) 4 mg disintegrating tablet, Take 1 tablet (4 mg total) by mouth every 8 (eight) hours as needed for nausea or vomiting, Disp: 8 tablet, Rfl: 0    (Not in a hospital admission)    ALLERGIES  No Known Allergies  Objective     PHYSICAL EXAM    VITAL SIGNS: Blood pressure 131/64, pulse 89, temperature 98 3 °F (36 8 °C), resp  rate 14, weight 74 8 kg (165 lb), SpO2 99 %  Constitutional:  Appears well developed and well nourished, no acute distress, non-toxic appearance   Eyes:  PERRL, EOMI, conjunctivae pink, sclerae non-icteric    HENT:  Normocephalic/Atraumatic, no rhinorrhea, mucous membranes moist   Neck: normal range of motion, no tenderness, supple   Respiratory:  No respiratory distress, normal breath sounds  Cardiovascular:  Normal rate, normal rhythm   GI:  Soft, non-tender, non-distended, normal bowel sounds, no organomegaly, no mass, no rebound, no guarding   :  No CVAT, no flank ecchymosis  Musculoskeletal:  No swelling or edema, no tenderness, no deformities  Integument:  Pink, warm, dry, Well hydrated, no rash, no erythema, no bullae   Lymphatic:  No cervical/ tonsillar/ submandibular lymphadenopathy noted   Neurologic:  Awake, Alert & oriented x 3, CN 2-12 intact, no focal neurological deficits  Psychiatric:  Speech and behavior appropriate       ED COURSE and MDM:    Assessment/Plan   Assessment:  Iwona Rosenbaum is a 23 y o  male presents for evaluation of n/v/d and abdominal cramping  Plan:  Symptom management, disposition as appropriate  Portions of the record may have been created with voice recognition software   Occasional wrong word or "sound a like" substitutions may have occurred due to the inherent limitations of voice recognition software       ED Provider  Electronically Signed by

## 2018-05-09 NOTE — DISCHARGE INSTRUCTIONS

## 2018-06-18 ENCOUNTER — HOSPITAL ENCOUNTER (EMERGENCY)
Facility: HOSPITAL | Age: 23
Discharge: HOME | End: 2018-06-18
Admitting: EMERGENCY MEDICINE
Payer: COMMERCIAL

## 2018-06-18 VITALS
RESPIRATION RATE: 16 BRPM | TEMPERATURE: 97.7 F | DIASTOLIC BLOOD PRESSURE: 71 MMHG | HEART RATE: 88 BPM | OXYGEN SATURATION: 100 % | SYSTOLIC BLOOD PRESSURE: 154 MMHG

## 2018-06-18 DIAGNOSIS — B07.9 WART: Primary | ICD-10-CM

## 2018-06-18 PROCEDURE — 99283 EMERGENCY DEPT VISIT LOW MDM: CPT

## 2018-06-19 NOTE — ED PROVIDER NOTES
History  Chief Complaint   Patient presents with    Finger Swelling     left fourth digit with growth on clifton aspect of hand, denies injury     20-year-old male presents today complaining of a lesion on his left 4th digit that he has had for weeks  He denies any pain or symptoms  Denies injury to the area  No history of same  Has not tried anything over-the-counter  Denies any drainage or redness  No decreased range of motion  Prior to Admission Medications   Prescriptions Last Dose Informant Patient Reported? Taking?   dicyclomine (BENTYL) 20 mg tablet   No No   Sig: Take 1 tablet (20 mg total) by mouth 2 (two) times a day   hyoscyamine (LEVSIN/SL) 0 125 mg SL tablet   No No   Sig: Take 1 tablet (0 125 mg total) by mouth every 4 (four) hours as needed for cramping   ondansetron (ZOFRAN-ODT) 4 mg disintegrating tablet   No No   Sig: Take 1 tablet (4 mg total) by mouth every 8 (eight) hours as needed for nausea or vomiting   ondansetron (ZOFRAN-ODT) 4 mg disintegrating tablet   No No   Sig: Take 1 tablet (4 mg total) by mouth every 8 (eight) hours as needed for nausea or vomiting      Facility-Administered Medications: None       History reviewed  No pertinent past medical history  History reviewed  No pertinent surgical history  History reviewed  No pertinent family history  I have reviewed and agree with the history as documented  Social History   Substance Use Topics    Smoking status: Current Every Day Smoker     Packs/day: 0 20     Types: Cigarettes    Smokeless tobacco: Never Used    Alcohol use No        Review of Systems   Musculoskeletal:        Lesion left 4th digit   All other systems reviewed and are negative  Physical Exam  Physical Exam   Constitutional: He appears well-developed and well-nourished  No distress  HENT:   Head: Normocephalic and atraumatic  Eyes: Conjunctivae are normal    Cardiovascular: Normal rate  Pulmonary/Chest: No respiratory distress  Musculoskeletal: Normal range of motion  Neurological: He is alert  Skin: Skin is warm and dry  Capillary refill takes less than 2 seconds  He is not diaphoretic  Left 4th finger with wart on palmar aspect at PIP joint  No redness or drainage  No tenderness to palpation  Psychiatric: He has a normal mood and affect  Vital Signs  ED Triage Vitals [06/18/18 2108]   Temperature Pulse Respirations Blood Pressure SpO2   97 7 °F (36 5 °C) 88 16 154/71 100 %      Temp Source Heart Rate Source Patient Position - Orthostatic VS BP Location FiO2 (%)   Temporal Monitor Sitting Right arm --      Pain Score       6           Vitals:    06/18/18 2108   BP: 154/71   Pulse: 88   Patient Position - Orthostatic VS: Sitting       Visual Acuity      ED Medications  Medications - No data to display    Diagnostic Studies  Results Reviewed     None                 No orders to display              Procedures  Procedures       Phone Contacts  ED Phone Contact    ED Course                               MDM  CritCare Time    Disposition  Final diagnoses:   None     ED Disposition     ED Disposition Condition Comment    Discharge  [address] discharge to home/self care  Condition at discharge: Good        Follow-up Information     Follow up With Specialties Details Why Contact Info    [facility]   [address] [address]  [address] [place] [address]   Dermatology, Multidisciplinary   [place] [address]  [phone]            Discharge Medication List as of 6/18/2018  9:18 PM      CONTINUE these medications which have NOT CHANGED    Details   dicyclomine (BENTYL) 20 mg tablet Take 1 tablet (20 mg total) by mouth 2 (two) times a day, Starting Fri 5/4/2018, Print      hyoscyamine (LEVSIN/SL) 0 125 mg SL tablet Take 1 tablet (0 125 mg total) by mouth every 4 (four) hours as needed for cramping, Starting Tue 5/8/2018, Print !! ondansetron (ZOFRAN-ODT) 4 mg disintegrating tablet Take 1 tablet (4 mg total) by mouth every 8 (eight) hours as needed for nausea or vomiting, Starting Fri 5/4/2018, Print      !! ondansetron (ZOFRAN-ODT) 4 mg disintegrating tablet Take 1 tablet (4 mg total) by mouth every 8 (eight) hours as needed for nausea or vomiting, Starting Tue 5/8/2018, Print       !! - Potential duplicate medications found  Please discuss with provider  No discharge procedures on file      ED Provider  Electronically Signed by           Eunice Mai PA-C  06/18/18 3648

## 2018-06-19 NOTE — DISCHARGE INSTRUCTIONS
Common Wart   WHAT YOU NEED TO KNOW:   A common wart is a thick, rough, skin growth caused by human papillomavirus virus (HPV)  HPV is a germ that spreads by skin-to-skin contact or contact with contaminated surfaces  Common warts are benign (not cancer)  DISCHARGE INSTRUCTIONS:   Contact your healthcare provider or dermatologist if:   · Your wart returns or does not go away after treatment  · Your wart grows larger, or begins to spread or cluster  · You have a wart on your face, genitals, or rectum  · Your wart bleeds, becomes painful, or drains pus  · You have questions about your condition or care  Medicines:   · Salicylic acid  helps dry and remove the wart  It is available without a prescription  Ask your healthcare provider where you can purchase it  Before you apply salicylic acid, soak the wart in warm water for 20 minutes  Keep your wart damp  Apply a small amount of salicylic acid directly to your wart  Do not apply salicylic acid to healthy skin  Cover the wart as directed  It is best to do this at bedtime  When you wake, use a pumice stone (a rough stone) or nail file to gently remove dead skin  Repeat as directed  · Take your medicine as directed  Contact your healthcare provider if you think your medicine is not helping or if you have side effects  Tell him or her if you are allergic to any medicine  Keep a list of the medicines, vitamins, and herbs you take  Include the amounts, and when and why you take them  Bring the list or the pill bottles to follow-up visits  Carry your medicine list with you in case of an emergency  Apply duct tape to your wart as directed: Your healthcare provider may tell you to apply duct tape to your wart  Duct tape helps dry and remove the wart  You may be directed to leave the duct tape on for 6 days  On day 7, take the tape off and soak the wart in warm water for 5 minutes  Gently scrape the wart with a pumice stone or nail file   Then apply a new piece of duct tape and follow the same steps until the wart is gone  Follow up with your healthcare provider as directed:  Write down your questions so you remember to ask them during your visits  © 2017 [employer] Information is for End User's use only and may not be sold, redistributed or otherwise used for commercial purposes  All illustrations and images included in CareNotes® are the copyrighted property of A D [employer] M [employer]  or Osmany Simmons  The above information is an  only  It is not intended as medical advice for individual conditions or treatments  Talk to your doctor, nurse or pharmacist before following any medical regimen to see if it is safe and effective for you

## 2019-01-31 ENCOUNTER — HOSPITAL ENCOUNTER (EMERGENCY)
Facility: HOSPITAL | Age: 24
Discharge: HOME | End: 2019-01-31
Attending: EMERGENCY MEDICINE | Admitting: EMERGENCY MEDICINE
Payer: COMMERCIAL

## 2019-01-31 VITALS
DIASTOLIC BLOOD PRESSURE: 71 MMHG | SYSTOLIC BLOOD PRESSURE: 133 MMHG | OXYGEN SATURATION: 100 % | RESPIRATION RATE: 18 BRPM | BODY MASS INDEX: 26.11 KG/M2 | WEIGHT: 186.5 LBS | HEART RATE: 73 BPM | HEIGHT: 71 IN | TEMPERATURE: 98.1 F

## 2019-01-31 DIAGNOSIS — B07.9 WART: Primary | ICD-10-CM

## 2019-01-31 DIAGNOSIS — L03.90 CELLULITIS: ICD-10-CM

## 2019-01-31 DIAGNOSIS — B35.9 RINGWORM: ICD-10-CM

## 2019-01-31 PROCEDURE — 99283 EMERGENCY DEPT VISIT LOW MDM: CPT

## 2019-01-31 RX ORDER — CEPHALEXIN 500 MG/1
500 CAPSULE ORAL EVERY 8 HOURS SCHEDULED
Qty: 30 CAPSULE | Refills: 0 | Status: SHIPPED | OUTPATIENT
Start: 2019-01-31 | End: 2019-02-10

## 2019-01-31 RX ORDER — IBUPROFEN 600 MG/1
600 TABLET ORAL EVERY 6 HOURS PRN
Qty: 30 TABLET | Refills: 0 | Status: SHIPPED | OUTPATIENT
Start: 2019-01-31 | End: 2020-01-26

## 2019-01-31 RX ORDER — CLOTRIMAZOLE 1 %
CREAM (GRAM) TOPICAL
Qty: 15 G | Refills: 0 | Status: SHIPPED | OUTPATIENT
Start: 2019-01-31 | End: 2020-01-26

## 2019-01-31 NOTE — DISCHARGE INSTRUCTIONS
Cellulitis   WHAT YOU NEED TO KNOW:   Cellulitis is a skin infection caused by bacteria  Cellulitis may go away on its own or you may need treatment  Your healthcare provider may draw a [place] around the outside edges of your cellulitis  If your cellulitis spreads, your healthcare provider will see it outside of the [place]  DISCHARGE INSTRUCTIONS:   Call 911 if:   · You have sudden trouble breathing or chest pain  Return to the emergency department if:   · Your wound gets larger and more painful  · You feel a crackling under your skin when you touch it  · You have purple dots or bumps on your skin, or you see bleeding under your skin  · You have new swelling and pain in your legs  · The red, warm, swollen area gets larger  · You see red streaks coming from the infected area  Contact your healthcare provider if:   · You have a fever  · Your fever or pain does not go away or gets worse  · The area does not get smaller after 2 days of antibiotics  · Your skin is flaking or peeling off  · You have questions or concerns about your condition or care  Medicines:   · Antibiotics  help treat the bacterial infection  · NSAIDs , such as ibuprofen, help decrease swelling, pain, and fever  NSAIDs can cause stomach bleeding or kidney problems in certain people  If you take blood thinner medicine, always ask if NSAIDs are safe for you  Always read the medicine label and follow directions  Do not give these medicines to children under 10months of age without direction from your child's healthcare provider  · Acetaminophen  decreases pain and fever  It is available without a doctor's order  Ask how much to take and how often to take it  Follow directions  Read the labels of all other medicines you are using to see if they also contain acetaminophen, or ask your doctor or pharmacist  Acetaminophen can cause liver damage if not taken correctly   Do not use more than 4 grams (4,000 milligrams) total of acetaminophen in one day  · Take your medicine as directed  Contact your healthcare provider if you think your medicine is not helping or if you have side effects  Tell him or her if you are allergic to any medicine  Keep a list of the medicines, vitamins, and herbs you take  Include the amounts, and when and why you take them  Bring the list or the pill bottles to follow-up visits  Carry your medicine list with you in case of an emergency  Self-care:   · Elevate the area above the level of your heart  as often as you can  This will help decrease swelling and pain  Prop the area on pillows or blankets to keep it elevated comfortably  · Clean the area daily until the wound scabs over  Gently wash the area with soap and water  Pat dry  Use dressings as directed  · Place cool or warm, wet cloths on the area as directed  Use clean cloths and clean water  Leave it on the area until the cloth is room temperature  Pat the area dry with a clean, dry cloth  The cloths may help decrease pain  Prevent cellulitis:   · Do not scratch bug bites or areas of injury  You increase your risk for cellulitis by scratching these areas  · Do not share personal items, such as towels, clothing, and razors  · Clean exercise equipment  with germ-killing  before and after you use it  · Wash your hands often  Use soap and water  Wash your hands after you use the bathroom, change a child's diapers, or sneeze  Wash your hands before you prepare or eat food  Use lotion to prevent dry, cracked skin  · Wear pressure stockings as directed  You may be told to wear the stockings if you have peripheral edema  The stockings improve blood flow and decrease swelling  · Treat athlete's foot  This can help prevent the spread of a bacterial skin infection  Follow up with your healthcare provider within 3 days, or as directed:   Your healthcare provider will check if your cellulitis is getting better  You may need different medicine  Write down your questions so you remember to ask them during your visits  © 2017 [address] [employer] Information is for End User's use only and may not be sold, redistributed or otherwise used for commercial purposes  All illustrations and images included in CareNotes® are the copyrighted property of [employer]  or [employer]  The above information is an  only  It is not intended as medical advice for individual conditions or treatments  Talk to your doctor, nurse or pharmacist before following any medical regimen to see if it is safe and effective for you  Cellulitis   AMBULATORY CARE:   Cellulitis  is a skin infection caused by bacteria  Cellulitis usually appears on the legs and feet, arms and hands, or face  Common Wart   AMBULATORY CARE:   A common wart  is a thick, rough, skin growth caused by human papillomavirus virus (HPV)  HPV is a germ that spreads by skin-to-skin contact or contact with contaminated surfaces  Common warts are benign (not cancer)  Signs and symptoms:  Common warts may form anywhere on your body, but are most common on your hands, fingers, knees, feet, and elbows  You may have any of the following:  · A raised, round, tan, or skin-colored, growth     · Black dots in the center of your wart, or bleeding if the wart is scratched or scraped    · Soreness around your wart  Contact your healthcare provider or dermatologist if:   · Your wart returns or does not go away after treatment  · Your wart grows larger, or begins to spread or cluster  · You have a wart on your face, genitals, or rectum  · Your wart bleeds, becomes painful, or drains pus  · You have questions about your condition or care  Treatment  may depend on the size, location, and number of warts you have  Your healthcare provider may freeze, burn, or inject your wart with medicine   You may need surgery if other treatments do not work  Some warts go away on their own without treatment  Some warts return after treatment  Apply salicylic acid to your wart as directed:  Salicylic acid helps dry and remove the wart  Before you apply salicylic acid, soak the wart in warm water for up to 20 minutes  Keep your wart damp  Apply a small amount of salicylic acid directly to your wart  Do not apply salicylic acid to healthy skin  Cover the wart as directed  It is best to do this at bedtime  When you wake, use a pumice stone (a rough stone) or nail file to gently remove dead skin  Repeat as directed  Apply duct tape to your wart as directed: Your healthcare provider may tell you to apply duct tape to your wart  Duct tape helps dry and remove the wart  You may be directed to leave the duct tape on for 6 days  On day 7, take the tape off and soak the wart in warm water for 5 minutes  Gently scrape the wart with a pumice stone or nail file  Then apply a new piece of duct tape and follow the same steps until the wart is gone  Follow up with your healthcare provider as directed:  Write down your questions so you remember to ask them during your visits  © 2017 [address] Information is for End User's use only and may not be sold, redistributed or otherwise used for commercial purposes  All illustrations and images included in CareNotes® are the copyrighted property of [employer]  or Osmany Simmons  The above information is an  only  It is not intended as medical advice for individual conditions or treatments  Talk to your doctor, nurse or pharmacist before following any medical regimen to see if it is safe and effective for you  Skin Yeast Infection   AMBULATORY CARE:   What do I need to know about a skin yeast infection? Yeast is normally present on the skin  Infection happens when you have too much yeast, or when it gets into a cut on your skin   Certain types of mold and fungus can cause a yeast infection  A skin yeast infection can appear anywhere on your skin or nail beds  Skin yeast infections are usually found on warm, moist parts of the body  Examples include between skin folds or under the breasts  Common symptoms include the following:  Signs and symptoms will depend on the type of yeast causing the infection, and where the infection is located  · Red, scaly skin    · Changes in skin color, especially a beefy red color    · Itching, dry skin    · Painful, cracking skin at the corners of your mouth    · Thick, discolored, chipping nails    · Skin lesions that may be red or purple and round    · Pus bumps  Seek care immediately if:   · You have signs of infection, such as pus, warmth or red streaks coming from the wound, or a fever  Contact your healthcare provider if:   · Your symptoms worsen or do not get better within 7 to 10 days  · You have new or returning signs of a skin yeast infection after treatment  · You have questions or concerns about your condition or care  Treatment for a skin yeast infection  may include antifungal medicine to treat the fungal infection  The medicine be given as a cream, ointment, or pill  Care for the skin near the infection:  You may only have discolored patches of skin, or areas that are dry and flaking  Care for these skin problems as directed by your healthcare provider  If you have painful skin or an open sore, you will need to protect the skin and prevent damage  You will also need to keep the skin dry as much as possible  Ask your healthcare provider how to care for your skin while the infection clears  The following are general guidelines for caring for painful or open skin:  · Keep the skin clean  Ask your healthcare provider if you should wash with mild soap and water  Do not use soap that contains alcohol  Alcohol can dry and irritate the skin and make symptoms worse   Your baby's healthcare provider may tell you to use diaper cream or ointment when you change his diaper  This will protect the skin and prevent moisture from collecting  · Keep the skin dry  Pat the area dry with a towel  Do not rub, because this may irritate the skin  If you have a skin yeast infection between skin folds, lift the top part gently and hold it while you dry between your skin folds  Always dry your feet completely after you swim or bathe, including between your toes  Dry your skin if you are sweating from exercise or exposure to heat  Use a clean towel each time to prevent spreading or continuing the infection  · Keep the skin protected  Ask your healthcare provider if you should cover the area with a bandage or leave it open  Check your skin each day to make sure you do not have new or worsening problems  You may need to have someone check the skin if you cannot see the area easily  Prevent another skin yeast infection:   · Do not share clothing or towels    · Wear shower shoes if you need to use a public shower    · Dry your feet completely after you bathe, and apply antifungal powder or cream as directed    · Put on socks before you get dressed so you do not spread fungus from your feet    · Wear light clothing that allows air to get to your skin    · Manage your weight to prevent skin folds where yeast can collect    · Manage diabetes    · Change your baby's diaper often, and keep the area clean and dry as much as possible    · Use a diaper cream or ointment that contains zinc oxide or dimethicone on your baby's diaper area as directed  Follow up with your healthcare provider as directed:  Write down your questions so you remember to ask them during your visits  © 2017 [address] Information is for End User's use only and may not be sold, redistributed or otherwise used for commercial purposes  All illustrations and images included in CareNotes® are the copyrighted property of [employer] Inc  or Osmany Simmons    The above information is an  only  It is not intended as medical advice for individual conditions or treatments  Talk to your doctor, nurse or pharmacist before following any medical regimen to see if it is safe and effective for you  Common signs and symptoms include the following:   · A red, warm, swollen area on your skin    · Pain when the area is touched    · Bumps or blisters (abscess) that may drain pus    · Bumpy, raised skin that feels like an orange peel  Call 911 if:   · You have sudden trouble breathing or chest pain  Seek care immediately if:   · Your wound gets larger and more painful  · You feel a crackling under your skin when you touch it  · You have purple dots or bumps on your skin, or you see bleeding under your skin  · You have new swelling and pain in your legs  · The red, warm, swollen area gets larger  · You see red streaks coming from the infected area  Contact your healthcare provider if:   · You have a fever  · Your fever or pain does not go away or gets worse  · The area does not get smaller after 2 days of antibiotics  · Your skin is flaking or peeling off  · You have questions or concerns about your condition or care  Treatment for cellulitis  may decrease symptoms, stop the infection from spreading, and cure the infection  Treatment depends on how severe your cellulitis is  Cellulitis may go away on its own  You may  instead need antibiotics to help treat the bacterial infection and medicines for pain  Your healthcare provider may draw a [place] around the edges of your cellulitis  If your cellulitis spreads, your healthcare provider will see it outside of the [place]  Manage your symptoms:   · Elevate the area above the level of your heart  as often as you can  This will help decrease swelling and pain  Prop the area on pillows or blankets to keep it elevated comfortably  · Clean the area daily until the wound scabs over    Gently wash the area with soap and water  Pat dry  Use dressings as directed  · Place cool or warm, wet cloths on the area as directed  Use clean cloths and clean water  Leave it on the area until the cloth is room temperature  Pat the area dry with a clean, dry cloth  The cloths may help decrease pain  Prevent cellulitis:   · Do not scratch bug bites or areas of injury  You increase your risk for cellulitis by scratching these areas  · Do not share personal items, such as towels, clothing, and razors  · Clean exercise equipment  with germ-killing  before and after you use it  · Wash your hands often  Use soap and water  Wash your hands after you use the bathroom, change a child's diapers, or sneeze  Wash your hands before you prepare or eat food  Use lotion to prevent dry, cracked skin  · Wear pressure stockings as directed  You may be told to wear the stockings if you have peripheral edema  The stockings improve blood flow and decrease swelling  · Treat athlete's foot  This can help prevent the spread of a bacterial skin infection  Follow up with your healthcare provider within 3 days, or as directed: Your healthcare provider will check if your cellulitis is getting better  You may need different medicine  Write down your questions so you remember to ask them during your visits  © 2017 [address] [employer] Information is for End User's use only and may not be sold, redistributed or otherwise used for commercial purposes  All illustrations and images included in CareNotes® are the copyrighted property of [employer] M [employer] Inc  or Osmany Simmnos  The above information is an  only  It is not intended as medical advice for individual conditions or treatments  Talk to your doctor, nurse or pharmacist before following any medical regimen to see if it is safe and effective for you

## 2019-01-31 NOTE — ED PROVIDER NOTES
History  Chief Complaint   Patient presents with    Finger Swelling     patient arrives states that for a month has had a pimple on right 2nd digit, arrives today because it is more swollen and painful, denies drainage, denies fevers        used: No    Medical Problem   Location:  Pt with right index finger pain and swelling  pt has a lump for months put compound w on 3 days ago now with  pain and swelling   Severity:  Mild  Onset quality:  Gradual  Duration:  3 days  Timing:  Constant  Progression:  Unchanged  Chronicity:  New  Associated symptoms: no abdominal pain, no chest pain, no congestion, no cough, no diarrhea, no ear pain, no fatigue, no fever, no headaches, no loss of consciousness, no myalgias, no nausea, no rash, no rhinorrhea, no shortness of breath, no sore throat, no vomiting and no wheezing        Prior to Admission Medications   Prescriptions Last Dose Informant Patient Reported? Taking?   dicyclomine (BENTYL) 20 mg tablet   No No   Sig: Take 1 tablet (20 mg total) by mouth 2 (two) times a day   hyoscyamine (LEVSIN/SL) 0 125 mg SL tablet   No No   Sig: Take 1 tablet (0 125 mg total) by mouth every 4 (four) hours as needed for cramping   ondansetron (ZOFRAN-ODT) 4 mg disintegrating tablet   No No   Sig: Take 1 tablet (4 mg total) by mouth every 8 (eight) hours as needed for nausea or vomiting   ondansetron (ZOFRAN-ODT) 4 mg disintegrating tablet   No No   Sig: Take 1 tablet (4 mg total) by mouth every 8 (eight) hours as needed for nausea or vomiting      Facility-Administered Medications: None       History reviewed  No pertinent past medical history  History reviewed  No pertinent surgical history  History reviewed  No pertinent family history  I have reviewed and agree with the history as documented      Social History   Substance Use Topics    Smoking status: Current Every Day Smoker     Packs/day: 0 20     Types: Cigarettes    Smokeless tobacco: Never Used   [facility] Alcohol use Yes      Comment: monthly        Review of Systems   Constitutional: Negative  Negative for fatigue and fever  HENT: Negative  Negative for congestion, ear pain, rhinorrhea and sore throat  Eyes: Negative  Respiratory: Negative  Negative for cough, shortness of breath and wheezing  Cardiovascular: Negative  Negative for chest pain  Gastrointestinal: Negative  Negative for abdominal pain, diarrhea, nausea and vomiting  Endocrine: Negative  Genitourinary: Negative  Musculoskeletal: Negative  Negative for myalgias  Skin: Negative  Negative for rash  Allergic/Immunologic: Negative  Neurological: Negative  Negative for loss of consciousness and headaches  Hematological: Negative  Psychiatric/Behavioral: Negative  All other systems reviewed and are negative  Physical Exam  Physical Exam   Constitutional: He is oriented to person, place, and time  He appears well-developed and well-nourished  HENT:   Head: Normocephalic  Right Ear: External ear normal    Left Ear: External ear normal    Nose: Nose normal    Mouth/Throat: Oropharynx is clear and moist    Eyes: Pupils are equal, round, and reactive to light  Conjunctivae and EOM are normal    Neck: Normal range of motion  Neck supple  Cardiovascular: Normal rate, regular rhythm and normal heart sounds  Pulmonary/Chest: Effort normal and breath sounds normal    Abdominal: Soft  Bowel sounds are normal    Musculoskeletal: Normal range of motion  Right index finger pip joint swelling and tender and erythema     +wart present    Neurological: He is alert and oriented to person, place, and time  Skin: Skin is warm  Neck tinea corporis    Psychiatric: He has a normal mood and affect  His behavior is normal    Nursing note and vitals reviewed        Vital Signs  ED Triage Vitals [01/31/19 1301]   Temperature Pulse Respirations Blood Pressure SpO2   98 1 °F (36 7 °C) 73 18 133/71 100 %      Temp Source Heart Rate Source Patient Position - Orthostatic VS BP Location FiO2 (%)   Tympanic Monitor Sitting Left arm --      Pain Score       Worst Possible Pain           Vitals:    01/31/19 1301   BP: 133/71   Pulse: 73   Patient Position - Orthostatic VS: Sitting       Visual Acuity      ED Medications  Medications - No data to display    Diagnostic Studies  Results Reviewed     None                 No orders to display              Procedures  Procedures       Phone Contacts  ED Phone Contact    ED Course                               MDM    Disposition  Final diagnoses:   Wart   Cellulitis   Ringworm     Time reflects when diagnosis was documented in both MDM as applicable and the Disposition within this note     Time User Action Codes Description Comment    1/31/2019  1:31 PM Jordana Hunterty  Add [B07 9] Wart     1/31/2019  1:31 PM Corrie Herrera  Add [L03 90] Cellulitis     1/31/2019  1:32 PM Jordana [place]  Add [B35 9] Ringworm       ED Disposition     ED Disposition Condition Date/Time Comment    Discharge  Thu Jan 31, 2019  1:31 PM Frederick Saxena discharge to home/self care      Condition at discharge: Good        Follow-up Information     Follow up With Specialties Details Why Contact Info    Terese García MD Family Medicine Schedule an appointment as soon as possible for a visit  [address] [place] [address]             Discharge Medication List as of 1/31/2019  1:36 PM      START taking these medications    Details   cephalexin (KEFLEX) 500 mg capsule Take 1 capsule (500 mg total) by mouth every 8 (eight) hours for 10 days, Starting Thu 1/31/2019, Until Sun 2/10/2019, Print      clotrimazole (LOTRIMIN) 1 % cream Apply to affected area 2 times daily, Print      ibuprofen (MOTRIN) 600 mg tablet Take 1 tablet (600 mg total) by mouth every 6 (six) hours as needed (pain), Starting Thu 1/31/2019, Print         CONTINUE these medications which have NOT CHANGED    Details   dicyclomine (BENTYL) 20 mg tablet Take 1 tablet (20 mg total) by mouth 2 (two) times a day, Starting Fri 5/4/2018, Print      hyoscyamine (LEVSIN/SL) 0 125 mg SL tablet Take 1 tablet (0 125 mg total) by mouth every 4 (four) hours as needed for cramping, Starting Tue 5/8/2018, Print      !! ondansetron (ZOFRAN-ODT) 4 mg disintegrating tablet Take 1 tablet (4 mg total) by mouth every 8 (eight) hours as needed for nausea or vomiting, Starting Fri 5/4/2018, Print      !! ondansetron (ZOFRAN-ODT) 4 mg disintegrating tablet Take 1 tablet (4 mg total) by mouth every 8 (eight) hours as needed for nausea or vomiting, Starting Tue 5/8/2018, Print       !! - Potential duplicate medications found  Please discuss with provider  No discharge procedures on file      ED Provider  Electronically Signed by           Kelin Hutson PA-C  01/31/19 4717

## 2019-03-11 ENCOUNTER — HOSPITAL ENCOUNTER (EMERGENCY)
Facility: HOSPITAL | Age: 24
Discharge: HOME | End: 2019-03-11
Attending: EMERGENCY MEDICINE | Admitting: EMERGENCY MEDICINE
Payer: COMMERCIAL

## 2019-03-11 VITALS
WEIGHT: 181.66 LBS | TEMPERATURE: 97.8 F | RESPIRATION RATE: 16 BRPM | OXYGEN SATURATION: 99 % | BODY MASS INDEX: 25.34 KG/M2 | SYSTOLIC BLOOD PRESSURE: 107 MMHG | HEART RATE: 84 BPM | DIASTOLIC BLOOD PRESSURE: 93 MMHG

## 2019-03-11 DIAGNOSIS — B35.4 TINEA CORPORIS: Primary | ICD-10-CM

## 2019-03-11 PROCEDURE — 99282 EMERGENCY DEPT VISIT SF MDM: CPT

## 2019-03-11 RX ORDER — KETOCONAZOLE 20 MG/G
CREAM TOPICAL DAILY
Qty: 15 G | Refills: 0 | Status: SHIPPED | OUTPATIENT
Start: 2019-03-11 | End: 2020-01-26

## 2019-03-11 RX ORDER — FLUCONAZOLE 100 MG/1
200 TABLET ORAL
Status: DISCONTINUED | OUTPATIENT
Start: 2019-03-11 | End: 2019-03-11

## 2019-03-11 RX ORDER — FLUCONAZOLE 100 MG/1
200 TABLET ORAL ONCE
Status: COMPLETED | OUTPATIENT
Start: 2019-03-11 | End: 2019-03-11

## 2019-03-11 RX ADMIN — FLUCONAZOLE 200 MG: 100 TABLET ORAL at 14:01

## 2019-03-11 NOTE — ED PROVIDER NOTES
History  Chief Complaint   Patient presents with    Rash     pt yanni hyatt he has a rash to lt hand, rt eyelid, behind both ears, lt axilla, rt collar bone, neck, and chest for 2-3 weeks  staets taht he was recently seen for a rash and given clotrimazole but rash is worse  History provided by:  Patient   used: No    Medical Problem   Location:  Pt continues with rash  Severity:  Mild  Onset quality:  Gradual  Timing:  Constant  Progression:  Unchanged  Associated symptoms: rash    Associated symptoms: no abdominal pain, no chest pain, no congestion, no cough, no diarrhea, no ear pain, no fatigue, no fever, no headaches, no loss of consciousness, no myalgias, no nausea, no rhinorrhea, no shortness of breath, no sore throat, no vomiting and no wheezing        Prior to Admission Medications   Prescriptions Last Dose Informant Patient Reported? Taking? clotrimazole (LOTRIMIN) 1 % cream   No No   Sig: Apply to affected area 2 times daily   dicyclomine (BENTYL) 20 mg tablet   No No   Sig: Take 1 tablet (20 mg total) by mouth 2 (two) times a day   hyoscyamine (LEVSIN/SL) 0 125 mg SL tablet   No No   Sig: Take 1 tablet (0 125 mg total) by mouth every 4 (four) hours as needed for cramping   ibuprofen (MOTRIN) 600 mg tablet   No No   Sig: Take 1 tablet (600 mg total) by mouth every 6 (six) hours as needed (pain)   ondansetron (ZOFRAN-ODT) 4 mg disintegrating tablet   No No   Sig: Take 1 tablet (4 mg total) by mouth every 8 (eight) hours as needed for nausea or vomiting   ondansetron (ZOFRAN-ODT) 4 mg disintegrating tablet   No No   Sig: Take 1 tablet (4 mg total) by mouth every 8 (eight) hours as needed for nausea or vomiting      Facility-Administered Medications: None       History reviewed  No pertinent past medical history  History reviewed  No pertinent surgical history  History reviewed  No pertinent family history  I have reviewed and agree with the history as documented      Social History     Tobacco Use    Smoking status: Current Every Day Smoker     Packs/day: 0 20     Types: Cigarettes    Smokeless tobacco: Never Used   Substance Use Topics    Alcohol use: Yes     Comment: monthly    Drug use: No        Review of Systems   Constitutional: Negative  Negative for fatigue and fever  HENT: Negative  Negative for congestion, ear pain, rhinorrhea and sore throat  Eyes: Negative  Respiratory: Negative  Negative for cough, shortness of breath and wheezing  Cardiovascular: Negative  Negative for chest pain  Gastrointestinal: Negative  Negative for abdominal pain, diarrhea, nausea and vomiting  Endocrine: Negative  Genitourinary: Negative  Musculoskeletal: Negative  Negative for myalgias  Skin: Positive for rash  Allergic/Immunologic: Negative  Neurological: Negative  Negative for loss of consciousness and headaches  Hematological: Negative  Psychiatric/Behavioral: Negative  All other systems reviewed and are negative  Physical Exam  Physical Exam   Constitutional: He appears well-developed and well-nourished  rx for diflucan 150mg    Disp 2 pills  Once po every 7 days   HENT:   Head: Normocephalic and atraumatic  Right Ear: External ear normal    Left Ear: External ear normal    Nose: Nose normal    Mouth/Throat: Oropharynx is clear and moist    Eyes: Pupils are equal, round, and reactive to light  Conjunctivae and EOM are normal    Neck: Normal range of motion  Neck supple  Cardiovascular: Normal rate, regular rhythm and normal heart sounds  Pulmonary/Chest: Effort normal and breath sounds normal    Abdominal: Soft  Bowel sounds are normal    Musculoskeletal: Normal range of motion  Neurological: He is alert  Skin: Skin is warm  Ringworm to neck     Psychiatric: He has a normal mood and affect  His behavior is normal    Nursing note and vitals reviewed        Vital Signs  ED Triage Vitals [03/11/19 1351]   Temperature Pulse Respirations Blood Pressure SpO2   97 8 °F (36 6 °C) 84 16 107/93 99 %      Temp Source Heart Rate Source Patient Position - Orthostatic VS BP Location FiO2 (%)   Tympanic Monitor Sitting Left arm --      Pain Score       --           Vitals:    03/11/19 1351   BP: 107/93   Pulse: 84   Patient Position - Orthostatic VS: Sitting       Visual Acuity      ED Medications  Medications   fluconazole (DIFLUCAN) tablet 200 mg (200 mg Oral Given 3/11/19 1401)       Diagnostic Studies  Results Reviewed     None                 No orders to display              Procedures  Procedures       Phone Contacts  ED Phone Contact    ED Course                               MDM    Disposition  Final diagnoses:   Tinea corporis     Time reflects when diagnosis was documented in both MDM as applicable and the Disposition within this note     Time User Action Codes Description Comment    3/11/2019  2:00 PM Vanessa Self  Add [B35 4] Tinea corporis       ED Disposition     ED Disposition Condition Date/Time Comment    Discharge Stable Mon Mar 11, 2019  2:00 PM Charleen Matute discharge to home/self care              Follow-up Information     Follow up With Specialties Details Why [address]  [phone]            Discharge Medication List as of 3/11/2019  2:03 PM      START taking these medications    Details   ketoconazole (NIZORAL) 2 % cream Apply topically daily, Starting Mon 3/11/2019, Print         CONTINUE these medications which have NOT CHANGED    Details   clotrimazole (LOTRIMIN) 1 % cream Apply to affected area 2 times daily, Print      dicyclomine (BENTYL) 20 mg tablet Take 1 tablet (20 mg total) by mouth 2 (two) times a day, Starting Fri 5/4/2018, Print      hyoscyamine (LEVSIN/SL) 0 125 mg SL tablet Take 1 tablet (0 125 mg total) by mouth every 4 (four) hours as needed for cramping, Starting Tue 5/8/2018, Print      ibuprofen (MOTRIN) 600 mg tablet Take 1 tablet (600 mg total) by mouth every 6 (six) hours as needed (pain), Starting Thu 1/31/2019, Print      !! ondansetron (ZOFRAN-ODT) 4 mg disintegrating tablet Take 1 tablet (4 mg total) by mouth every 8 (eight) hours as needed for nausea or vomiting, Starting Fri 5/4/2018, Print      !! ondansetron (ZOFRAN-ODT) 4 mg disintegrating tablet Take 1 tablet (4 mg total) by mouth every 8 (eight) hours as needed for nausea or vomiting, Starting Tue 5/8/2018, Print       !! - Potential duplicate medications found  Please discuss with provider  No discharge procedures on file      ED Provider  Electronically Signed by           Ken Ennis PA-C  03/11/19 5769

## 2019-05-18 ENCOUNTER — HOSPITAL ENCOUNTER (EMERGENCY)
Facility: HOSPITAL | Age: 24
Discharge: HOME | End: 2019-05-18
Attending: EMERGENCY MEDICINE | Admitting: EMERGENCY MEDICINE
Payer: COMMERCIAL

## 2019-05-18 VITALS
HEART RATE: 94 BPM | TEMPERATURE: 101 F | DIASTOLIC BLOOD PRESSURE: 68 MMHG | OXYGEN SATURATION: 99 % | SYSTOLIC BLOOD PRESSURE: 100 MMHG | RESPIRATION RATE: 20 BRPM

## 2019-05-18 DIAGNOSIS — B34.9 VIRAL SYNDROME: Primary | ICD-10-CM

## 2019-05-18 PROCEDURE — 96372 THER/PROPH/DIAG INJ SC/IM: CPT

## 2019-05-18 PROCEDURE — 99283 EMERGENCY DEPT VISIT LOW MDM: CPT

## 2019-05-18 PROCEDURE — 99283 EMERGENCY DEPT VISIT LOW MDM: CPT | Performed by: EMERGENCY MEDICINE

## 2019-05-18 RX ORDER — ACETAMINOPHEN 325 MG/1
975 TABLET ORAL ONCE
Status: COMPLETED | OUTPATIENT
Start: 2019-05-18 | End: 2019-05-18

## 2019-05-18 RX ORDER — IBUPROFEN 400 MG/1
400 TABLET ORAL EVERY 6 HOURS PRN
Qty: 30 TABLET | Refills: 0 | Status: SHIPPED | OUTPATIENT
Start: 2019-05-18 | End: 2020-01-26

## 2019-05-18 RX ORDER — KETOROLAC TROMETHAMINE 30 MG/ML
15 INJECTION, SOLUTION INTRAMUSCULAR; INTRAVENOUS ONCE
Status: COMPLETED | OUTPATIENT
Start: 2019-05-18 | End: 2019-05-18

## 2019-05-18 RX ADMIN — DEXAMETHASONE SODIUM PHOSPHATE 10 MG: 10 INJECTION, SOLUTION INTRAMUSCULAR; INTRAVENOUS at 22:14

## 2019-05-18 RX ADMIN — ACETAMINOPHEN 975 MG: 325 TABLET, FILM COATED ORAL at 22:14

## 2019-05-18 RX ADMIN — KETOROLAC TROMETHAMINE 15 MG: 30 INJECTION, SOLUTION INTRAMUSCULAR at 22:15

## 2020-01-26 ENCOUNTER — HOSPITAL ENCOUNTER (EMERGENCY)
Facility: HOSPITAL | Age: 25
Discharge: HOME | End: 2020-01-26
Attending: EMERGENCY MEDICINE

## 2020-01-26 VITALS
SYSTOLIC BLOOD PRESSURE: 111 MMHG | TEMPERATURE: 102.9 F | DIASTOLIC BLOOD PRESSURE: 85 MMHG | BODY MASS INDEX: 25.34 KG/M2 | OXYGEN SATURATION: 98 % | HEIGHT: 71 IN | HEART RATE: 94 BPM | RESPIRATION RATE: 18 BRPM

## 2020-01-26 DIAGNOSIS — J03.90 ACUTE TONSILLITIS: Primary | ICD-10-CM

## 2020-01-26 PROCEDURE — 99283 EMERGENCY DEPT VISIT LOW MDM: CPT

## 2020-01-26 PROCEDURE — 99282 EMERGENCY DEPT VISIT SF MDM: CPT | Performed by: PHYSICIAN ASSISTANT

## 2020-01-26 RX ORDER — IBUPROFEN 600 MG/1
600 TABLET ORAL EVERY 8 HOURS PRN
Qty: 30 TABLET | Refills: 0 | Status: SHIPPED | OUTPATIENT
Start: 2020-01-26 | End: 2020-02-02

## 2020-01-26 RX ORDER — PENICILLIN V POTASSIUM 500 MG/1
500 TABLET ORAL 4 TIMES DAILY
Qty: 40 TABLET | Refills: 0 | Status: SHIPPED | OUTPATIENT
Start: 2020-01-26 | End: 2020-02-05

## 2020-01-26 RX ORDER — ACETAMINOPHEN 500 MG
500 TABLET ORAL EVERY 4 HOURS PRN
Qty: 20 TABLET | Refills: 0 | Status: SHIPPED | OUTPATIENT
Start: 2020-01-26 | End: 2020-01-31

## 2020-01-26 RX ORDER — ACETAMINOPHEN 325 MG/1
975 TABLET ORAL ONCE
Status: COMPLETED | OUTPATIENT
Start: 2020-01-26 | End: 2020-01-26

## 2020-01-26 RX ADMIN — DEXAMETHASONE SODIUM PHOSPHATE 10 MG: 10 INJECTION, SOLUTION INTRAMUSCULAR; INTRAVENOUS at 11:04

## 2020-01-26 RX ADMIN — ACETAMINOPHEN 975 MG: 325 TABLET ORAL at 11:03

## 2020-01-26 NOTE — ED PROVIDER NOTES
History  Chief Complaint   Patient presents with    Fever - 9 weeks to 74 years     Patient reports pain and swelling in his throat as well as generalized aching starting 3 days ago  28-year-old male presents to emergency room for evaluation sore throat  Onset 3 days ago  States it feels swollen  States his whole body hurts and when he stands up his head hurts even more  Denies nausea or vomiting  Denies rash  Denies nasal congestion or ear pain  States he is only able to drink very cold water to stay hydrated  Using ibuprofen with minimal relief  Denies sick contacts  Denies history of throat surgery  Denies recent travel outside the country  History provided by:  Patient  Fever - 9 weeks to 74 years   Associated symptoms: sore throat    Associated symptoms: no chills, no congestion, no cough, no ear pain, no rash and no vomiting        None       History reviewed  No pertinent past medical history  History reviewed  No pertinent surgical history  History reviewed  No pertinent family history  I have reviewed and agree with the history as documented  Social History     Tobacco Use    Smoking status: Current Every Day Smoker     Packs/day: 0 20     Types: Cigarettes    Smokeless tobacco: Never Used   Substance Use Topics    Alcohol use: Not Currently     Comment: monthly    Drug use: Not Currently        Review of Systems   Constitutional: Positive for appetite change and fever  Negative for chills  HENT: Positive for sore throat  Negative for congestion and ear pain  Eyes: Negative for redness  Respiratory: Negative for cough and shortness of breath  Gastrointestinal: Negative for abdominal pain and vomiting  Skin: Negative for rash  Physical Exam  Physical Exam   Constitutional: He appears well-developed and well-nourished     HENT:   Right Ear: Tympanic membrane and external ear normal    Left Ear: Tympanic membrane and external ear normal    Nose: Nose normal  No rhinorrhea  Mouth/Throat: Uvula is midline and mucous membranes are normal  No trismus in the jaw  No uvula swelling  Posterior oropharyngeal erythema present  No tonsillar abscesses  Tonsils are 2+ on the right  Tonsils are 2+ on the left  Tonsillar exudate  Eyes: Conjunctivae are normal    Neck: Neck supple  Cardiovascular: Normal rate, regular rhythm and normal heart sounds  Pulmonary/Chest: Effort normal and breath sounds normal    Lymphadenopathy:     He has cervical adenopathy (tender)  Neurological: He is alert  Skin: Skin is warm and dry  No rash noted  Nursing note and vitals reviewed  Vital Signs  ED Triage Vitals [01/26/20 1003]   Temperature Pulse Respirations Blood Pressure SpO2   (!) 102 9 °F (39 4 °C) 94 18 111/85 98 %      Temp Source Heart Rate Source Patient Position - Orthostatic VS BP Location FiO2 (%)   Oral Monitor Sitting Left arm --      Pain Score       Worst Possible Pain           Vitals:    01/26/20 1003   BP: 111/85   Pulse: 94   Patient Position - Orthostatic VS: Sitting         Visual Acuity      ED Medications  Medications   dexamethasone 10 mg/mL oral liquid 10 mg 1 mL (10 mg Oral Given 1/26/20 1104)   acetaminophen (TYLENOL) tablet 975 mg (975 mg Oral Given 1/26/20 1103)       Diagnostic Studies  Results Reviewed     None                 No orders to display              Procedures  Procedures         ED Course                               MDM  Number of Diagnoses or Management Options  Acute tonsillitis:   Risk of Complications, Morbidity, and/or Mortality  General comments: Patient meets center criteria for likelihood of strep, score 4      Discussed with patient importance of staying hydrated and signs and symptoms if worse to return to emergency department      Patient Progress  Patient progress: stable        Disposition  Final diagnoses:   Acute tonsillitis     Time reflects when diagnosis was documented in both MDM as applicable and the Disposition within this note     Time User Action Codes Description Comment    1/26/2020 10:50 AM Kev Gamez Add [J03 90] Acute tonsillitis       ED Disposition     ED Disposition Condition Date/Time Comment    Discharge Stable Sun Jan 26, 2020 10:50 AM Rolf Fee discharge to home/self care  Follow-up Information     Follow up With Specialties Details Why Contact Info Additional [address] In 3 days  [facility],Third [facility] [address] [place] [address] Emergency Department Emergency Medicine  If symptoms worsen [address]  [phone]  ED, [address] [place] [address], [facility]   [phone]          Patient's Medications   Discharge Prescriptions    ACETAMINOPHEN (TYLENOL) 500 MG TABLET    Take 1 tablet (500 mg total) by mouth every 4 (four) hours as needed for mild pain (not to exceed 4000mg tylenol in 24h) for up to 5 days       Start Date: 1/26/2020 End Date: 1/31/2020       Order Dose: 500 mg       Quantity: 20 tablet    Refills: 0    IBUPROFEN (MOTRIN) 600 MG TABLET    Take 1 tablet (600 mg total) by mouth every 8 (eight) hours as needed for mild pain or moderate pain (with food) for up to 7 days       Start Date: 1/26/2020 End Date: 2/2/2020       Order Dose: 600 mg       Quantity: 30 tablet    Refills: 0    PENICILLIN V POTASSIUM (VEETID) 500 MG TABLET    Take 1 tablet (500 mg total) by mouth 4 (four) times a day for 10 days       Start Date: 1/26/2020 End Date: 2/5/2020       Order Dose: 500 mg       Quantity: 40 tablet    Refills: 0     No discharge procedures on file      ED Provider  Electronically Signed by           Lilian Gomez PA-C  01/26/20 6970

## 2020-01-30 NOTE — ED ATTENDING ATTESTATION
Patient was seen by the physician assistant  I was present in the ED during evaluation, and available for consultation  Chart reviewed and agree with the disposition and plan as outlined above

## 2020-06-15 DIAGNOSIS — Z11.59 SPECIAL SCREENING EXAMINATION FOR UNSPECIFIED VIRAL DISEASE: Primary | ICD-10-CM

## 2020-06-15 PROCEDURE — U0003 INFECTIOUS AGENT DETECTION BY NUCLEIC ACID (DNA OR RNA); SEVERE ACUTE RESPIRATORY SYNDROME CORONAVIRUS 2 (SARS-COV-2) (CORONAVIRUS DISEASE [COVID-19]), AMPLIFIED PROBE TECHNIQUE, MAKING USE OF HIGH THROUGHPUT TECHNOLOGIES AS DESCRIBED BY CMS-2020-01-R: HCPCS

## 2020-06-16 LAB — SARS-COV-2 RNA SPEC QL NAA+PROBE: NOT DETECTED

## 2020-06-17 ENCOUNTER — TELEPHONE (OUTPATIENT)
Dept: OTHER | Facility: OTHER | Age: 25
End: 2020-06-17

## 2020-06-18 ENCOUNTER — TELEMEDICINE (OUTPATIENT)
Dept: FAMILY MEDICINE CLINIC | Facility: CLINIC | Age: 25
End: 2020-06-18

## 2020-06-18 DIAGNOSIS — R05.9 COUGH: Primary | ICD-10-CM

## 2020-06-18 PROCEDURE — 99442 PR PHYS/QHP TELEPHONE EVALUATION 11-20 MIN: CPT | Performed by: INTERNAL MEDICINE

## 2022-04-15 ENCOUNTER — HOSPITAL ENCOUNTER (EMERGENCY)
Facility: HOSPITAL | Age: 27
Discharge: HOME | End: 2022-04-15
Attending: EMERGENCY MEDICINE | Admitting: EMERGENCY MEDICINE
Payer: COMMERCIAL

## 2022-04-15 VITALS
OXYGEN SATURATION: 99 % | RESPIRATION RATE: 18 BRPM | HEART RATE: 71 BPM | TEMPERATURE: 97.9 F | SYSTOLIC BLOOD PRESSURE: 144 MMHG | WEIGHT: 169.75 LBS | BODY MASS INDEX: 23.68 KG/M2 | DIASTOLIC BLOOD PRESSURE: 77 MMHG

## 2022-04-15 DIAGNOSIS — J11.1 INFLUENZA-LIKE ILLNESS: Primary | ICD-10-CM

## 2022-04-15 PROCEDURE — 99284 EMERGENCY DEPT VISIT MOD MDM: CPT | Performed by: PHYSICIAN ASSISTANT

## 2022-04-15 PROCEDURE — 99283 EMERGENCY DEPT VISIT LOW MDM: CPT

## 2022-04-15 PROCEDURE — 87636 SARSCOV2 & INF A&B AMP PRB: CPT | Performed by: PHYSICIAN ASSISTANT

## 2022-04-15 RX ORDER — GUAIFENESIN/DEXTROMETHORPHAN 100-10MG/5
5 SYRUP ORAL 3 TIMES DAILY PRN
Qty: 118 ML | Refills: 0 | Status: SHIPPED | OUTPATIENT
Start: 2022-04-15 | End: 2022-04-25

## 2022-04-15 RX ORDER — ONDANSETRON 4 MG/1
4 TABLET, ORALLY DISINTEGRATING ORAL ONCE
Status: COMPLETED | OUTPATIENT
Start: 2022-04-15 | End: 2022-04-15

## 2022-04-15 RX ORDER — IBUPROFEN 400 MG/1
400 TABLET ORAL EVERY 6 HOURS PRN
Qty: 12 TABLET | Refills: 0 | Status: SHIPPED | OUTPATIENT
Start: 2022-04-15

## 2022-04-15 RX ORDER — ACETAMINOPHEN 500 MG
500 TABLET ORAL EVERY 6 HOURS PRN
Qty: 30 TABLET | Refills: 0 | Status: SHIPPED | OUTPATIENT
Start: 2022-04-15

## 2022-04-15 RX ORDER — FLUTICASONE PROPIONATE 50 MCG
1 SPRAY, SUSPENSION (ML) NASAL DAILY
Qty: 16 G | Refills: 0 | Status: SHIPPED | OUTPATIENT
Start: 2022-04-15

## 2022-04-15 RX ORDER — ONDANSETRON 4 MG/1
4 TABLET, FILM COATED ORAL EVERY 6 HOURS
Qty: 12 TABLET | Refills: 0 | Status: SHIPPED | OUTPATIENT
Start: 2022-04-15

## 2022-04-15 RX ADMIN — ONDANSETRON 4 MG: 4 TABLET, ORALLY DISINTEGRATING ORAL at 09:25

## 2022-04-15 NOTE — ED PROVIDER NOTES
HPI: Patient is a 27 y o  male who presents with  7 days of fever, chills, cough, headache, sore throat, fatigue, myalgias, nausea, vomiting and Congestion which the patient describes at moderate The patient has had contact with people with similar symptoms  The patient taken OTC medication with relief of symptoms  Patient IS NOT Vaccinated for COVID-19  No Known Allergies    History reviewed  No pertinent past medical history  History reviewed  No pertinent surgical history  Social History     Tobacco Use    Smoking status: Current Every Day Smoker     Packs/day: 0 50     Types: Cigarettes    Smokeless tobacco: Never Used   Substance Use Topics    Alcohol use: Not Currently     Comment: monthly    Drug use: Not Currently       Nursing notes reviewed  Physical Exam:  ED Triage Vitals [04/15/22 0858]   Temperature Pulse Respirations Blood Pressure SpO2   97 9 °F (36 6 °C) 71 18 144/77 99 %      Temp Source Heart Rate Source Patient Position - Orthostatic VS BP Location FiO2 (%)   Oral Monitor Lying Left arm --      Pain Score       --           ROS: Positive for cough, congestion, fevers, chills, nausea, vomiting, fatigue, body aches, the remainder of a 10 organ system ROS was otherwise unremarkable  General: awake, alert, no acute distress  Head: normocephalic, atraumatic  Eyes: no scleral icterus  Ears: external ears normal, hearing grossly intact  Nose: external exam grossly normal, positive nasal discharge  Neck: symmetric, No JVD noted, trachea midline  Pulmonary: no respiratory distress, no tachypnea noted  Cardiovascular: appears well perfused  Abdomen: no distention noted  Musculoskeletal: no deformities noted, tone normal  Neuro: grossly non-focal  Psych: mood and affect appropriate    The patient is stable and has a history and physical exam consistent with a viral illness  COVID19 testing has been performed    I considered the patient's other medical conditions as applicable/noted above in my medical decision making  The patient is stable upon discharge  The plan is for supportive care at home  The patient (and any family present) verbalized understanding of the discharge instructions and warnings that would necessitate return to the Emergency Department  All questions were answered prior to discharge  Medications   ondansetron (ZOFRAN-ODT) dispersible tablet 4 mg (4 mg Oral Given 4/15/22 0925)     Final diagnoses:   Influenza-like illness     Time reflects when diagnosis was documented in both MDM as applicable and the Disposition within this note     Time User Action Codes Description Comment    4/15/2022  9:23 AM Jame Hudson [J11 1] Influenza-like illness       ED Disposition     ED Disposition Condition Date/Time Comment    Discharge Good Fri Apr 15, 2022  9:24 AM Phyllis Letters discharge to home/self care              Follow-up Information     Follow up With Specialties Details Why Contact Info    Daniel Sinha MD Family Medicine Schedule an appointment as soon as possible for a visit in 2 days As needed [address]  [phone]          Discharge Medication List as of 4/15/2022  9:27 AM      START taking these medications    Details   acetaminophen (TYLENOL) 500 mg tablet Take 1 tablet (500 mg total) by mouth every 6 (six) hours as needed for mild pain, Starting Fri 4/15/2022, Normal      dextromethorphan-guaiFENesin (ROBITUSSIN DM)  mg/5 mL syrup Take 5 mL by mouth 3 (three) times a day as needed (cough) for up to 10 days, Starting Fri 4/15/2022, Until Mon 4/25/2022 at 2359, Normal      fluticasone (FLONASE) 50 mcg/act nasal spray 1 spray into each nostril daily, Starting Fri 4/15/2022, Normal      menthol-cetylpyridinium (CEPACOL) 3 MG lozenge Take 1 lozenge (3 mg total) by mouth as needed for sore throat, Starting Fri 4/15/2022, Normal      ondansetron (ZOFRAN) 4 mg tablet Take 1 tablet (4 mg total) by mouth every 6 (six) hours, Starting Fri 4/15/2022, Normal         CONTINUE these medications which have CHANGED    Details   ibuprofen (MOTRIN) 400 mg tablet Take 1 tablet (400 mg total) by mouth every 6 (six) hours as needed for mild pain, Starting Fri 4/15/2022, Normal           No discharge procedures on file      Electronically Signed by       Carlin Wiley PA-C  04/15/22 2626

## 2022-04-15 NOTE — Clinical Note
Lore Dotson was seen and treated in our emergency department on 4/15/2022  Diagnosis:     [address]    He may return on this date: 04/18/2022    May not return to work/school until COVID-19 results return  If negative may return to school/work on date listed above  If Positive Jaya Butts is required until 5 days after symptoms began  If you have any questions or concerns, please don't hesitate to call        Lara Calderón PA-C    ______________________________           _______________          _______________  Hospital Representative                              Date                                Time

## 2022-04-16 LAB
FLUAV RNA RESP QL NAA+PROBE: NEGATIVE
FLUBV RNA RESP QL NAA+PROBE: NEGATIVE
SARS-COV-2 RNA RESP QL NAA+PROBE: POSITIVE

## 2022-04-17 NOTE — RESULT ENCOUNTER NOTE
Patient aware of positive covid test, symptoms started 9 days ago and he is feeling better, he can come off isolation but mask additional day